# Patient Record
Sex: MALE | Race: WHITE | NOT HISPANIC OR LATINO | Employment: FULL TIME | URBAN - NONMETROPOLITAN AREA
[De-identification: names, ages, dates, MRNs, and addresses within clinical notes are randomized per-mention and may not be internally consistent; named-entity substitution may affect disease eponyms.]

---

## 2017-01-06 ENCOUNTER — TELEPHONE (OUTPATIENT)
Dept: FAMILY MEDICINE | Facility: OTHER | Age: 42
End: 2017-01-06

## 2017-01-06 NOTE — TELEPHONE ENCOUNTER
Reason for call:  RESULTS    1. What is the test that was ordered?   Chest X-ray  2. Who ordered your test?   Employment physical / Job Care  3. When was the test performed?  11/21/2016  Description:   Gerson received a letter from [employer] stating that there are abnormalities in his chest x-ray and to contact his primary provider.  Please call [facility] to advise  Was an appointment offered for this a call?  No  Preferred method for responding to this message:  [phone]  If we cannot reach you directly, may we leave a detailed response at the number you provided?  Yes  Can this message wait until your PCP/Provider returns if not available today? (Yes CAPS/NO SMALL: [phone])

## 2017-02-03 ENCOUNTER — OFFICE VISIT (OUTPATIENT)
Dept: FAMILY MEDICINE | Facility: OTHER | Age: 42
End: 2017-02-03
Attending: FAMILY MEDICINE
Payer: COMMERCIAL

## 2017-02-03 VITALS
DIASTOLIC BLOOD PRESSURE: 72 MMHG | WEIGHT: 195 LBS | SYSTOLIC BLOOD PRESSURE: 121 MMHG | HEART RATE: 76 BPM | BODY MASS INDEX: 26.82 KG/M2 | TEMPERATURE: 96.8 F | RESPIRATION RATE: 17 BRPM

## 2017-02-03 DIAGNOSIS — L03.114 CELLULITIS OF LEFT UPPER EXTREMITY: Primary | ICD-10-CM

## 2017-02-03 DIAGNOSIS — R55 VASOVAGAL SYNCOPE: ICD-10-CM

## 2017-02-03 DIAGNOSIS — M25.522 LEFT ELBOW PAIN: ICD-10-CM

## 2017-02-03 LAB
GRAM STN SPEC: NORMAL
MICRO REPORT STATUS: NORMAL
SPECIMEN SOURCE: NORMAL

## 2017-02-03 PROCEDURE — 10140 I&D HMTMA SEROMA/FLUID COLLJ: CPT | Performed by: FAMILY MEDICINE

## 2017-02-03 PROCEDURE — 99214 OFFICE O/P EST MOD 30 MIN: CPT | Mod: 25 | Performed by: FAMILY MEDICINE

## 2017-02-03 PROCEDURE — 87070 CULTURE OTHR SPECIMN AEROBIC: CPT | Performed by: FAMILY MEDICINE

## 2017-02-03 PROCEDURE — 87205 SMEAR GRAM STAIN: CPT | Performed by: FAMILY MEDICINE

## 2017-02-03 RX ORDER — KETOROLAC TROMETHAMINE 30 MG/ML
30 INJECTION, SOLUTION INTRAMUSCULAR; INTRAVENOUS ONCE
Qty: 1 ML | Refills: 0 | OUTPATIENT
Start: 2017-02-03 | End: 2017-02-03

## 2017-02-03 RX ORDER — CEPHALEXIN 500 MG/1
500 CAPSULE ORAL 3 TIMES DAILY
Qty: 30 CAPSULE | Refills: 0 | Status: SHIPPED | OUTPATIENT
Start: 2017-02-03 | End: 2018-03-15

## 2017-02-03 NOTE — NURSING NOTE
The following medication was given:     MEDICATION: Ketorolac Tromethamine 60MG/2ML (30 mg/mL) (Toradol)  ROUTE: IM  SITE: LUQ - Gluteus  DOSE: 1 ml  LOT #: [id number]  :  [employer]  EXPIRATION DATE: 1/2018  NDC#: 92492-516-78  Pt waits 20 minutes, no reaction, pt ok to leave clinic.  Pt rates pain 8/10 prior to injection and 6/10 15 minutes after injection.    Lucretia Weaver

## 2017-02-03 NOTE — PROGRESS NOTES
SUBJECTIVE:                                                    Gerson Powers is a 41 year old male who presents to clinic today for the following health issues:      Musculoskeletal problem/pain      Duration: this am    Description  Location: left elbow     Intensity:  mild    Accompanying signs and symptoms: swelling, redness    History  Previous similar problem: no   Previous evaluation:  none    Precipitating or alleviating factors:  Trauma or overuse: YES- had pimple on it yesterday, today   Aggravating factors include: none    Therapies tried and outcome: nothing     Denies any direct injury, very painful and radiates down toward fingers    Problem list and histories reviewed & adjusted, as indicated.  Additional history: as documented    Current Outpatient Prescriptions   Medication Sig Dispense Refill     cephALEXin (KEFLEX) 500 MG capsule Take 1 capsule (500 mg) by mouth 3 times daily 30 capsule 0     cyclobenzaprine (FLEXERIL) 10 MG tablet Take 1 tablet (10 mg) by mouth 3 times daily as needed for muscle spasms 30 tablet 3     Problem list, Medication list, Allergies, and Medical/Social/Surgical histories reviewed in EPIC and updated as appropriate.    ROS:  Constitutional, HEENT, cardiovascular, pulmonary, gi and gu systems are negative, except as otherwise noted.    OBJECTIVE:                                                    /72 mmHg  Pulse 76  Temp(Src) 96.8  F (36  C)  Resp 17  Wt 195 lb (88.451 kg)  Body mass index is 26.82 kg/(m^2).  GENERAL APPEARANCE: healthy, alert and no distress initially but after I&D got lightheaded and dizzy, nauseaous, better after ice pack applied, given juice and laid down, occurred again after toradol shot, resolved before discharge  RESP: lungs clear to auscultation - no rales, rhonchi or wheezes  CV: regular rates and rhythm, normal S1 S2, no S3 or S4 and no murmur, click or rub  SKIN: erythema - elbow  PSYCH: mentation appears normal and affect  normal/bright       ASSESSMENT/PLAN:                                                    1. Cellulitis of left upper extremity  No purulence observed, fair amount of bleeding in spite of lidocaine with epi  - Wound Culture Aerobic Bacterial  - Gram stain  - cephALEXin (KEFLEX) 500 MG capsule; Take 1 capsule (500 mg) by mouth 3 times daily  Dispense: 30 capsule; Refill: 0  - Skin Abscess, Simple [31413]  - Hematoma/Fluid Drainage [[id number]]    2. Vasovagal syncope  He was on call overnight and did not sleep, had a little to eat but not much, dsicussed rest, fluids, and eating when he gets home, has mom to drive him home which was recommended.  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 Left elbow pain  toradol given to help with pain control., use ice also  - INJECTION INTRAMUSCULAR OR SUB-Q    Over 40 min spent with this patient, over 50% education and counseling    Patient was agreeable to this plan and had no further questions.  See Patient Instructions    Jesica Clarke MD  [facility]

## 2017-02-03 NOTE — MR AVS SNAPSHOT
After Visit Summary   2/3/2017    Gerson Powers    MRN: [id number]           Patient Information     Date Of Birth          1975        Visit Information        Provider Department      2/3/2017 9:15 AM Jesica Clarke MD [facility]        Today's Diagnoses     Cellulitis of left upper extremity    -  1     Vasovagal syncope         Left elbow pain           Care Instructions    Follow up if not improving        Follow-ups after your visit        Who to contact     If you have questions or need follow up information about today's clinic visit or your schedule please contact [facility] directly at [phone].  Normal or non-critical lab and imaging results will be communicated to you by Exoprisehart, letter or phone within 4 business days after the clinic has received the results. If you do not hear from us within 7 days, please contact the clinic through [employer] or phone. If you have a critical or abnormal lab result, we will notify you by phone as soon as possible.  Submit refill requests through HuoBi or call your pharmacy and they will forward the refill request to us. Please allow 3 business days for your refill to be completed.          Additional Information About Your Visit        MyChart Information     [employer] gives you secure access to your electronic health record. If you see a primary care provider, you can also send messages to your care team and make appointments. If you have questions, please call your primary care clinic.  If you do not have a primary care provider, please call [phone] and they will assist you.        Care EveryWhere ID     This is your Care EveryWhere ID. This could be used by other organizations to access your [facility] medical records  [id number]        Your Vitals Were     Pulse Temperature Respirations             76 96.8  F (36  C) 17          Blood Pressure from Last 3 Encounters:   02/03/17 121/72   11/21/16 118/70    06/25/16 138/78    Weight from Last 3 Encounters:   02/03/17 195 lb (88.451 kg)   11/21/16 195 lb (88.451 kg)   11/19/15 195 lb (88.451 kg)              We Performed the Following     Gram stain     Hematoma/Fluid Drainage [[id number]]     INJECTION INTRAMUSCULAR OR SUB-Q     Skin Abscess, Simple [[id number]]     Wound Culture Aerobic Bacterial          Today's Medication Changes          These changes are accurate as of: 2/3/17 10:15 AM.  If you have any questions, ask your nurse or doctor.               Start taking these medicines.        Dose/Directions    cephALEXin 500 MG capsule   Commonly known as:  KEFLEX   Used for:  Cellulitis of left upper extremity   Started by:  Jesica Clarke MD        Dose:  500 mg   Take 1 capsule (500 mg) by mouth 3 times daily   Quantity:  30 capsule   Refills:  0       ketorolac 30 MG/ML injection   Commonly known as:  TORADOL   Used for:  Cellulitis of left upper extremity, Left elbow pain   Started by:  Jesica Clarke MD        Dose:  30 mg   Inject 1 mL (30 mg) into the muscle once for 1 dose   Quantity:  1 mL   Refills:  0            Where to get your medicines      These medications were sent to [id number] - [place], [place] - [address]   [id number] , [place] [address]     Phone:  [phone]    - cephALEXin 500 MG capsule      Some of these will need a paper prescription and others can be bought over the counter.  Ask your nurse if you have questions.     You don't need a prescription for these medications    - ketorolac 30 MG/ML injection             Primary Care Provider Office Phone # Fax #    Alexis Ann [phone]       [facility] [address] [place] [address]        Thank you!     [facility]. Our goal is always to provide you with excellent care. Hearing back from our patients is one way we can continue to improve our services. Please take a few minutes to  complete the written survey that you may receive in the mail after your visit with us. Thank you!             Your Updated Medication List - Protect others around you: Learn how to safely use, store and throw away your medicines at www.disposemymeds.org.          This list is accurate as of: 2/3/17 10:15 AM.  Always use your most recent med list.                   Brand Name Dispense Instructions for use    cephALEXin 500 MG capsule    KEFLEX    30 capsule    Take 1 capsule (500 mg) by mouth 3 times daily       cyclobenzaprine 10 MG tablet    FLEXERIL    30 tablet    Take 1 tablet (10 mg) by mouth 3 times daily as needed for muscle spasms       ketorolac 30 MG/ML injection    TORADOL    1 mL    Inject 1 mL (30 mg) into the muscle once for 1 dose

## 2017-02-03 NOTE — NURSING NOTE
"Chief Complaint   Patient presents with     Musculoskeletal Problem       Initial /72 mmHg  Pulse 76  Temp(Src) 96.8  F (36  C)  Resp 17  Wt 195 lb (88.451 kg) Estimated body mass index is 26.82 kg/(m^2) as calculated from the following:    Height as of 11/21/16: 5' 11.5\" (1.816 m).    Weight as of this encounter: 195 lb (88.451 kg).  BP completed using cuff size: regular  "

## 2017-02-03 NOTE — PROGRESS NOTES
Subjective: Gerson SOLIZ Powers a 41 year old male who presents today for lesion removal. The lesion(s) is/are located on the elbow, number 1 and measures 7 x 5 cm He The patient reports the lesion is painfull and denies other significant symptoms on ROS. Medications reviewed.    Pause for the cause has been completed prior to the prceedure.   1. Gerson was identified by both name and date of birth   2. The correct site was identified   3. Site was marked by provider    4. Written informed consent correct and signed or verbal authorization  to proceed was obtained   5. Verifed necessary supplies, equipment, and diagnostics are available    6. Time out was performed immediately prior to procedure    Objective: The lesion(s) is/are of the above mentioned size and location and is/are erythematous. The area was prepped and appropriately anesthetized. Using the usual technique, I & D  was performed. An appropriate dressing was applied. The procedure was well tolerated and without complications.     Assessment: cellulits vs hematoma    Plan: Follow up: The patient may return prn.. Wound care instructions provided.  Patient was instructed to be alert for any signs of cutaneous infection.

## 2017-02-06 ENCOUNTER — OFFICE VISIT (OUTPATIENT)
Dept: FAMILY MEDICINE | Facility: OTHER | Age: 42
End: 2017-02-06
Attending: FAMILY MEDICINE
Payer: COMMERCIAL

## 2017-02-06 ENCOUNTER — TELEPHONE (OUTPATIENT)
Dept: FAMILY MEDICINE | Facility: OTHER | Age: 42
End: 2017-02-06

## 2017-02-06 VITALS
HEIGHT: 72 IN | SYSTOLIC BLOOD PRESSURE: 132 MMHG | TEMPERATURE: 98.3 F | DIASTOLIC BLOOD PRESSURE: 78 MMHG | BODY MASS INDEX: 26.41 KG/M2 | HEART RATE: 72 BPM | WEIGHT: 195 LBS

## 2017-02-06 DIAGNOSIS — L03.114 CELLULITIS OF LEFT UPPER EXTREMITY: Primary | ICD-10-CM

## 2017-02-06 PROCEDURE — 99213 OFFICE O/P EST LOW 20 MIN: CPT | Performed by: FAMILY MEDICINE

## 2017-02-06 ASSESSMENT — PAIN SCALES - GENERAL: PAINLEVEL: MILD PAIN (2)

## 2017-02-06 NOTE — NURSING NOTE
"Chief Complaint   Patient presents with     RECHECK       Initial /78 mmHg  Pulse 72  Temp(Src) 98.3  F (36.8  C) (Tympanic)  Ht 5' 11.5\" (1.816 m)  Wt 195 lb (88.451 kg)  BMI 26.82 kg/m2 Estimated body mass index is 26.82 kg/(m^2) as calculated from the following:    Height as of this encounter: 5' 11.5\" (1.816 m).    Weight as of this encounter: 195 lb (88.451 kg).  Medication Reconciliation: complete   Lucretia Weaver    "

## 2017-02-06 NOTE — TELEPHONE ENCOUNTER
Pt states his elbow is still red, swollen and painful.  He states it is a little better today than on Friday.  Pt is taking abx.  He has appt today, do you still want to see him or should he do anything different?

## 2017-02-06 NOTE — TELEPHONE ENCOUNTER
8:10 AM    Reason for Call: Phone Call    Description: Patient was in last Friday and stated that if he was not better within 24-36 hours to come back and see provider. Patient stated he is not better and did schedule today with provider, but would like to speak with nurse to see if he needed to come in or not.    Was an appointment offered for this call? Yes    Preferred method for responding to this message: Telephone Call    If we cannot reach you directly, may we leave a detailed response at the number you provided? Yes    Can this message wait until your PCP/provider returns, if available today? Not applicable,     Kimberly Sullivan

## 2017-02-06 NOTE — PROGRESS NOTES
"  SUBJECTIVE:                                                    Gerson Powers is a 41 year old male who presents to clinic today for the following health issues:      recheck      Duration: 4 days    Description (location/character/radiation): left elbow    Intensity:  moderate    Accompanying signs and symptoms: painful, red, swollen    History (similar episodes/previous evaluation): None    Precipitating or alleviating factors: None    Therapies tried and outcome: I & D, keflex       Problem list and histories reviewed & adjusted, as indicated.  Additional history: as documented    Current Outpatient Prescriptions   Medication Sig Dispense Refill     cephALEXin (KEFLEX) 500 MG capsule Take 1 capsule (500 mg) by mouth 3 times daily 30 capsule 0     cyclobenzaprine (FLEXERIL) 10 MG tablet Take 1 tablet (10 mg) by mouth 3 times daily as needed for muscle spasms 30 tablet 3     Problem list, Medication list, Allergies, and Medical/Social/Surgical histories reviewed in EPIC and updated as appropriate.    ROS:  Constitutional, HEENT, cardiovascular, pulmonary, gi and gu systems are negative, except as otherwise noted.    OBJECTIVE:                                                    /78 mmHg  Pulse 72  Temp(Src) 98.3  F (36.8  C) (Tympanic)  Ht 5' 11.5\" (1.816 m)  Wt 195 lb (88.451 kg)  BMI 26.82 kg/m2  Body mass index is 26.82 kg/(m^2).  GENERAL APPEARANCE: healthy, alert and no distress  SKIN: erythema - right upper extremity/forearm  PSYCH: mentation appears normal and affect normal/bright       ASSESSMENT/PLAN:                                                    1. Cellulitis of left upper extremity  Per patient has significantly improved since Saturday, will have him call thurs/fri if not better and switch to bactrim    Patient was agreeable to this plan and had no further questions.  See Patient Instructions    Jesica Clarke MD  [facility] [place]        "

## 2017-02-10 LAB
BACTERIA SPEC CULT: NORMAL
MICRO REPORT STATUS: NORMAL
SPECIMEN SOURCE: NORMAL

## 2018-03-15 ENCOUNTER — OFFICE VISIT (OUTPATIENT)
Dept: FAMILY MEDICINE | Facility: OTHER | Age: 43
End: 2018-03-15
Attending: FAMILY MEDICINE
Payer: COMMERCIAL

## 2018-03-15 VITALS
TEMPERATURE: 97.3 F | OXYGEN SATURATION: 96 % | DIASTOLIC BLOOD PRESSURE: 72 MMHG | BODY MASS INDEX: 29.13 KG/M2 | WEIGHT: 203.5 LBS | HEIGHT: 70 IN | SYSTOLIC BLOOD PRESSURE: 128 MMHG | HEART RATE: 84 BPM

## 2018-03-15 DIAGNOSIS — J32.9 CHRONIC SINUSITIS, UNSPECIFIED LOCATION: ICD-10-CM

## 2018-03-15 DIAGNOSIS — Z00.00 ROUTINE GENERAL MEDICAL EXAMINATION AT A HEALTH CARE FACILITY: Primary | ICD-10-CM

## 2018-03-15 DIAGNOSIS — G43.009 MIGRAINE WITHOUT AURA AND WITHOUT STATUS MIGRAINOSUS, NOT INTRACTABLE: ICD-10-CM

## 2018-03-15 DIAGNOSIS — M50.30 DDD (DEGENERATIVE DISC DISEASE), CERVICAL: ICD-10-CM

## 2018-03-15 LAB
ALBUMIN SERPL-MCNC: 4 G/DL (ref 3.4–5)
ALP SERPL-CCNC: 95 U/L (ref 40–150)
ALT SERPL W P-5'-P-CCNC: 94 U/L (ref 0–70)
ANION GAP SERPL CALCULATED.3IONS-SCNC: 5 MMOL/L (ref 3–14)
AST SERPL W P-5'-P-CCNC: 40 U/L (ref 0–45)
BILIRUB SERPL-MCNC: 0.3 MG/DL (ref 0.2–1.3)
BUN SERPL-MCNC: 14 MG/DL (ref 7–30)
CALCIUM SERPL-MCNC: 8.6 MG/DL (ref 8.5–10.1)
CHLORIDE SERPL-SCNC: 107 MMOL/L (ref 94–109)
CO2 SERPL-SCNC: 29 MMOL/L (ref 20–32)
CREAT SERPL-MCNC: 0.95 MG/DL (ref 0.66–1.25)
GFR SERPL CREATININE-BSD FRML MDRD: 87 ML/MIN/1.7M2
GLUCOSE SERPL-MCNC: 78 MG/DL (ref 70–99)
POTASSIUM SERPL-SCNC: 3.7 MMOL/L (ref 3.4–5.3)
PROT SERPL-MCNC: 7.5 G/DL (ref 6.8–8.8)
SODIUM SERPL-SCNC: 141 MMOL/L (ref 133–144)

## 2018-03-15 PROCEDURE — 99396 PREV VISIT EST AGE 40-64: CPT | Performed by: FAMILY MEDICINE

## 2018-03-15 PROCEDURE — 80053 COMPREHEN METABOLIC PANEL: CPT | Performed by: FAMILY MEDICINE

## 2018-03-15 PROCEDURE — 36415 COLL VENOUS BLD VENIPUNCTURE: CPT | Performed by: FAMILY MEDICINE

## 2018-03-15 RX ORDER — FLUTICASONE PROPIONATE 50 MCG
1-2 SPRAY, SUSPENSION (ML) NASAL DAILY
Qty: 1 BOTTLE | Refills: 3 | Status: SHIPPED | OUTPATIENT
Start: 2018-03-15 | End: 2019-05-23

## 2018-03-15 RX ORDER — CYCLOBENZAPRINE HCL 5 MG
5 TABLET ORAL 2 TIMES DAILY PRN
Qty: 30 TABLET | Refills: 1 | Status: SHIPPED | OUTPATIENT
Start: 2018-03-15 | End: 2019-05-23

## 2018-03-15 ASSESSMENT — ANXIETY QUESTIONNAIRES
2. NOT BEING ABLE TO STOP OR CONTROL WORRYING: NOT AT ALL
6. BECOMING EASILY ANNOYED OR IRRITABLE: SEVERAL DAYS
4. TROUBLE RELAXING: NOT AT ALL
IF YOU CHECKED OFF ANY PROBLEMS ON THIS QUESTIONNAIRE, HOW DIFFICULT HAVE THESE PROBLEMS MADE IT FOR YOU TO DO YOUR WORK, TAKE CARE OF THINGS AT HOME, OR GET ALONG WITH OTHER PEOPLE: NOT DIFFICULT AT ALL
5. BEING SO RESTLESS THAT IT IS HARD TO SIT STILL: NOT AT ALL
1. FEELING NERVOUS, ANXIOUS, OR ON EDGE: NOT AT ALL
3. WORRYING TOO MUCH ABOUT DIFFERENT THINGS: NOT AT ALL
GAD7 TOTAL SCORE: 1
7. FEELING AFRAID AS IF SOMETHING AWFUL MIGHT HAPPEN: NOT AT ALL

## 2018-03-15 ASSESSMENT — PAIN SCALES - GENERAL: PAINLEVEL: NO PAIN (0)
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
## 2018-03-15 NOTE — MR AVS SNAPSHOT
After Visit Summary   3/15/2018    Gerson Powers    MRN: [id number]           Patient Information     Date Of Birth          1975        Visit Information        Provider Department      3/15/2018 1:30 PM Jesica Clarke MD [facility]        Today's Diagnoses     Routine general medical examination at a health care facility    -  1    DDD (degenerative disc disease), cervical        Migraine without aura and without status migrainosus, not intractable        Chronic sinusitis, unspecified location          Care Instructions      Preventive Health Recommendations  Male Ages 40 to 49    Yearly exam:             See your health care provider every year in order to  o   Review health changes.   o   Discuss preventive care.    o   Review your medicines if your doctor has prescribed any.    You should be tested each year for STDs (sexually transmitted diseases) if you re at risk.     Have a cholesterol test every 5 years.     Have a colonoscopy (test for colon cancer) if someone in your family has had colon cancer or polyps before age 50.     After age 45, have a diabetes test (fasting glucose). If you are at risk for diabetes, you should have this test every 3 years.      Talk with your health care provider about whether or not a prostate cancer screening test (PSA) is right for you.    Shots: Get a flu shot each year. Get a tetanus shot every 10 years.     Nutrition:    Eat at least 5 servings of fruits and vegetables daily.     Eat whole-grain bread, whole-wheat pasta and brown rice instead of white grains and rice.     Talk to your provider about Calcium and Vitamin D.     Lifestyle    Exercise for at least 150 minutes a week (30 minutes a day, 5 days a week). This will help you control your weight and prevent disease.     Limit alcohol to one drink per day.     No smoking.     Wear sunscreen to prevent skin cancer.     See your dentist every six months for an exam and cleaning.               Follow-ups after your visit        Additional Services     OTOLARYNGOLOGY REFERRAL       Your provider has referred you to: [facility] [phone]    Rule out deviated septum, nasal polyps   [email or web address]    Please be aware that coverage of these services is subject to the terms and limitations of your health insurance plan.  Call member services at your health plan with any benefit or coverage questions.      Please bring the following with you to your appointment:    (1) Any X-Rays, CTs or MRIs which have been performed.  Contact the facility where they were done to arrange for  prior to your scheduled appointment.   (2) List of current medications  (3) This referral request   (4) Any documents/labs given to you for this referral                  Future tests that were ordered for you today     Open Future Orders        Priority Expected Expires Ordered    Lipid Profile (Chol, Trig, HDL, LDL calc) Routine 3/14/2018 3/1/2019 3/13/2018            Who to contact     If you have questions or need follow up information about today's clinic visit or your schedule please contact [facility] at [phone].  Normal or non-critical lab and imaging results will be communicated to you by MyChart, letter or phone within 4 business days after the clinic has received the results. If you do not hear from us within 7 days, please contact the clinic through MyChart or phone. If you have a critical or abnormal lab result, we will notify you by phone as soon as possible.  Submit refill requests through [employer] or call your pharmacy and they will forward the refill request to us. Please allow 3 business days for your refill to be completed.          Additional Information About Your Visit        [employer] Information     [employer] gives you secure access to your electronic health record. If you see a primary care  "provider, you can also send messages to your care team and make appointments. If you have questions, please call your primary care clinic.  If you do not have a primary care provider, please call [phone] and they will assist you.        Care EveryWhere ID     This is your Care EveryWhere ID. This could be used by other organizations to access your [facility] medical records  [id number]        Your Vitals Were     Pulse Temperature Height Pulse Oximetry BMI (Body Mass Index)       84 97.3  F (36.3  C) (Tympanic) 5' 10.47\" (1.79 m) 96% 28.81 kg/m2        Blood Pressure from Last 3 Encounters:   03/15/18 128/72   02/06/17 132/78   02/03/17 121/72    Weight from Last 3 Encounters:   03/15/18 203 lb 8 oz (92.3 kg)   02/06/17 195 lb (88.5 kg)   02/03/17 195 lb (88.5 kg)              We Performed the Following     Comprehensive metabolic panel     OTOLARYNGOLOGY REFERRAL          Today's Medication Changes          These changes are accurate as of 3/15/18  2:06 PM.  If you have any questions, ask your nurse or doctor.               Start taking these medicines.        Dose/Directions    cyclobenzaprine 5 MG tablet   Commonly known as:  FLEXERIL   Used for:  DDD (degenerative disc disease), cervical, Migraine without aura and without status migrainosus, not intractable   Started by:  Jesica Clarke MD        Dose:  5 mg   Take 1 tablet (5 mg) by mouth 2 times daily as needed for muscle spasms   Quantity:  30 tablet   Refills:  1       fluticasone 50 MCG/ACT spray   Commonly known as:  FLONASE   Used for:  Chronic sinusitis, unspecified location   Started by:  Jesica Clarke MD        Dose:  1-2 spray   Spray 1-2 sprays into both nostrils daily   Quantity:  1 Bottle   Refills:  3            Where to get your medicines      These medications were sent to [facility] - [address] [address] - [address] [address]     Phone:  [phone]     cyclobenzaprine 5 MG tablet    fluticasone " 50 MCG/ACT spray                Primary Care Provider Office Phone # Fax #    Jesica Clarke [phone]       [facility] [place] [address]  [facility] [address]        Equal Access to Services     JOSELUIS BUSCH : Hadii aad ku hadlennyo Soomaali, waaxda luqadaha, qaybta kaalmada adeegyada, waxcecil mortonin shannonn adekeely humphries iris molina. So [facility] [phone].    ATENCIÓN: Si habla español, tiene a escobedo disposición servicios gratuitos de asistencia lingüística. Llame al [phone].    We comply with applicable federal civil rights laws and [place] laws. We do not discriminate on the basis of race, color, national origin, age, disability, sex, sexual orientation, or gender identity.            Thank you!     Thank you for choosing [facility] ([facility]  for your care. Our goal is always to provide you with excellent care. Hearing back from our patients is one way we can continue to improve our services. Please take a few minutes to complete the written survey that you may receive in the mail after your visit with us. Thank you!             Your Updated Medication List - Protect others around you: Learn how to safely use, store and throw away your medicines at www.disposemymeds.org.          This list is accurate as of 3/15/18  2:06 PM.  Always use your most recent med list.                   Brand Name Dispense Instructions for use Diagnosis    cyclobenzaprine 5 MG tablet    FLEXERIL    30 tablet    Take 1 tablet (5 mg) by mouth 2 times daily as needed for muscle spasms    DDD (degenerative disc disease), cervical, Migraine without aura and without status migrainosus, not intractable       fluticasone 50 MCG/ACT spray    FLONASE    1 Bottle    Spray 1-2 sprays into both nostrils daily    Chronic sinusitis, unspecified location

## 2018-03-15 NOTE — LETTER
March 15, 2018      Gerson Powers  [address]   [place] [address]        To Whom It May Concern:    Gerson Powers was seen in our clinic for a complete physical. He may return to work 3/17/2018 with no restrictions.      Sincerely,        Jesica Clarke MD

## 2018-03-15 NOTE — NURSING NOTE
"Chief Complaint   Patient presents with     Physical       Initial /72  Pulse 84  Temp 97.3  F (36.3  C) (Tympanic)  Ht 5' 10.47\" (1.79 m)  Wt 203 lb 8 oz (92.3 kg)  SpO2 96%  BMI 28.81 kg/m2 Estimated body mass index is 28.81 kg/(m^2) as calculated from the following:    Height as of this encounter: 5' 10.47\" (1.79 m).    Weight as of this encounter: 203 lb 8 oz (92.3 kg).  Medication Reconciliation: complete     Lidya Riggs    "

## 2018-03-16 ASSESSMENT — ANXIETY QUESTIONNAIRES: GAD7 TOTAL SCORE: 1

## 2018-03-16 ASSESSMENT — PATIENT HEALTH QUESTIONNAIRE - PHQ9: SUM OF ALL RESPONSES TO PHQ QUESTIONS 1-9: 1

## 2018-03-20 ENCOUNTER — TELEPHONE (OUTPATIENT)
Dept: FAMILY MEDICINE | Facility: OTHER | Age: 43
End: 2018-03-20

## 2018-03-20 DIAGNOSIS — Z00.00 ROUTINE GENERAL MEDICAL EXAMINATION AT A HEALTH CARE FACILITY: ICD-10-CM

## 2018-03-20 DIAGNOSIS — E78.1 HYPERTRIGLYCERIDEMIA: Primary | ICD-10-CM

## 2018-03-20 LAB
CHOLEST SERPL-MCNC: 210 MG/DL
HDLC SERPL-MCNC: 35 MG/DL
LDLC SERPL CALC-MCNC: 122 MG/DL
NONHDLC SERPL-MCNC: 175 MG/DL
TRIGL SERPL-MCNC: 266 MG/DL

## 2018-03-20 PROCEDURE — 36415 COLL VENOUS BLD VENIPUNCTURE: CPT | Performed by: FAMILY MEDICINE

## 2018-03-20 PROCEDURE — 80061 LIPID PANEL: CPT | Performed by: FAMILY MEDICINE

## 2018-03-20 RX ORDER — GEMFIBROZIL 600 MG/1
600 TABLET, FILM COATED ORAL 2 TIMES DAILY
Qty: 60 TABLET | Refills: 1 | Status: SHIPPED | OUTPATIENT
Start: 2018-03-20 | End: 2018-07-20

## 2018-03-20 NOTE — TELEPHONE ENCOUNTER
Notified patient of lab results, he is willing to take something for his cholesterol and triglycerides. He is wondering what you will prescribe him and any major side effects? Let me know and I'll hudson it up for you and get back to him. Thanks!  Cristal Levy

## 2018-03-21 ENCOUNTER — OFFICE VISIT (OUTPATIENT)
Dept: OTOLARYNGOLOGY | Facility: OTHER | Age: 43
End: 2018-03-21
Attending: FAMILY MEDICINE
Payer: COMMERCIAL

## 2018-03-21 VITALS
BODY MASS INDEX: 28.42 KG/M2 | DIASTOLIC BLOOD PRESSURE: 76 MMHG | WEIGHT: 203 LBS | OXYGEN SATURATION: 98 % | HEIGHT: 71 IN | SYSTOLIC BLOOD PRESSURE: 120 MMHG | HEART RATE: 73 BPM | TEMPERATURE: 96.5 F

## 2018-03-21 DIAGNOSIS — R09.81 NASAL CONGESTION: Primary | ICD-10-CM

## 2018-03-21 DIAGNOSIS — R09.82 PND (POST-NASAL DRIP): ICD-10-CM

## 2018-03-21 DIAGNOSIS — R06.83 SNORING: ICD-10-CM

## 2018-03-21 DIAGNOSIS — J34.3 NASAL TURBINATE HYPERTROPHY: ICD-10-CM

## 2018-03-21 PROCEDURE — 99213 OFFICE O/P EST LOW 20 MIN: CPT | Mod: 25 | Performed by: NURSE PRACTITIONER

## 2018-03-21 PROCEDURE — 31231 NASAL ENDOSCOPY DX: CPT | Performed by: NURSE PRACTITIONER

## 2018-03-21 RX ORDER — CETIRIZINE HYDROCHLORIDE 10 MG/1
10 TABLET ORAL EVERY EVENING
Qty: 30 TABLET | Refills: 11 | Status: SHIPPED | OUTPATIENT
Start: 2018-03-21 | End: 2018-06-22 | Stop reason: ALTCHOICE

## 2018-03-21 ASSESSMENT — PAIN SCALES - GENERAL: PAINLEVEL: NO PAIN (0)

## 2018-03-21 NOTE — PATIENT INSTRUCTIONS
Zyrtec 10 mg daily.  Flonase 2 sprays to each nostril once daily.    Use nasal saline as discussed today. Over the counter Cecil med saline irrigation is recommended.   Use warm distilled water and 1-2 packets of the salt solution that comes with the bottle (2 packets if you were instructed to use hypertonic saline), dissolve in bottle up to 240 ml dorothy.  Irrigate each side of your nose leaning over the sink, using 1/3 to 1/2 the volume of the bottle in each nostril every irrigation.  Irrigate 2-3 times daily and as needed. After irrigation, blow nose gently, then apply any other nasal medication that you may have been prescribed today (nasal steroids or nasal antihistamines)     Follow up in 6 weeks, sooner as needed.      Thank you for allowing Maryjane Iniguez CNP and our ENT team to participate in your care.  If your medications are too expensive, please give the nurse a call.  We can possibly change this medication.  If you have a scheduling or an appointment question please contact [facility] Coordinator at their direct line [phone].   ALL nursing questions or concerns can be directed to your ENT nurse at: [phone]- [place]
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
## 2018-03-21 NOTE — NURSING NOTE
"Chief Complaint   Patient presents with     Consult     Chronic sinusitis-fernando        Initial /76 (BP Location: Right arm, Patient Position: Chair, Cuff Size: Adult Large)  Pulse 73  Temp 96.5  F (35.8  C) (Tympanic)  Ht 5' 11\" (1.803 m)  Wt 203 lb (92.1 kg)  SpO2 98%  BMI 28.31 kg/m2 Estimated body mass index is 28.31 kg/(m^2) as calculated from the following:    Height as of this encounter: 5' 11\" (1.803 m).    Weight as of this encounter: 203 lb (92.1 kg).  Medication Reconciliation: complete     Haydee Cherry LPN    Scope: [id number]  "
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
## 2018-03-21 NOTE — MR AVS SNAPSHOT
After Visit Summary   3/21/2018    Gerson Powers    MRN: [id number]           Patient Information     Date Of Birth          1975        Visit Information        Provider Department      3/21/2018 9:45 AM Maryjane Iniguez APRN CNP [facility] [place]        Today's Diagnoses     Nasal congestion    -  1    PND (post-nasal drip)          Care Instructions    Zyrtec 10 mg daily.  Flonase 2 sprays to each nostril once daily.    Use nasal saline as discussed today. Over the counter Cecil med saline irrigation is recommended.   Use warm distilled water and 1-2 packets of the salt solution that comes with the bottle (2 packets if you were instructed to use hypertonic saline), dissolve in bottle up to 240 ml dorothy.  Irrigate each side of your nose leaning over the sink, using 1/3 to 1/2 the volume of the bottle in each nostril every irrigation.  Irrigate 2-3 times daily and as needed. After irrigation, blow nose gently, then apply any other nasal medication that you may have been prescribed today (nasal steroids or nasal antihistamines)     Follow up in 6 weeks, sooner as needed.      Thank you for allowing Maryjane Iniguez CNP and our ENT team to participate in your care.  If your medications are too expensive, please give the nurse a call.  We can possibly change this medication.  If you have a scheduling or an appointment question please contact [facility] our Health Unit Coordinator at their direct line [phone].   ALL nursing questions or concerns can be directed to your ENT nurse at: [phone]- Faraz            Follow-ups after your visit        Follow-up notes from your care team     Return in about 6 weeks (around 5/2/2018) for f/u nasal congestion.      Future tests that were ordered for you today     Open Future Orders        Priority Expected Expires Ordered    Comprehensive metabolic panel Routine 5/3/2018 3/1/2019 3/20/2018    Lipid Profile (Chol, Trig, HDL, LDL calc) Routine 5/3/2018  "3/1/2019 3/20/2018            Who to contact     If you have questions or need follow up information about today's clinic visit or your schedule please contact [facility] [place] directly at [phone].  Normal or non-critical lab and imaging results will be communicated to you by MyChart, letter or phone within 4 business days after the clinic has received the results. If you do not hear from us within 7 days, please contact the clinic through MyChart or phone. If you have a critical or abnormal lab result, we will notify you by phone as soon as possible.  Submit refill requests through goBramble or call your pharmacy and they will forward the refill request to us. Please allow 3 business days for your refill to be completed.          Additional Information About Your Visit        Looking for [employer] Information     go[employer] gives you secure access to your electronic health record. If you see a primary care provider, you can also send messages to your care team and make appointments. If you have questions, please call your primary care clinic.  If you do not have a primary care provider, please call [phone] and they will assist you.        Care EveryWhere ID     This is your Care EveryWhere ID. This could be used by other organizations to access your [place] medical records  [id number]        Your Vitals Were     Pulse Temperature Height Pulse Oximetry BMI (Body Mass Index)       73 96.5  F (35.8  C) (Tympanic) 5' 11\" (1.803 m) 98% 28.31 kg/m2        Blood Pressure from Last 3 Encounters:   03/21/18 120/76   03/15/18 128/72   02/06/17 132/78    Weight from Last 3 Encounters:   03/21/18 203 lb (92.1 kg)   03/15/18 203 lb 8 oz (92.3 kg)   02/06/17 195 lb (88.5 kg)              Today, you had the following     No orders found for display         Today's Medication Changes          These changes are accurate as of 3/21/18 10:11 AM.  If you have any questions, ask your nurse or doctor.               Start taking these " medicines.        Dose/Directions    cetirizine 10 MG tablet   Commonly known as:  zyrTEC   Used for:  Nasal congestion, PND (post-nasal drip)   Started by:  Maryjane Iniguez APRN CNP        Dose:  10 mg   Take 1 tablet (10 mg) by mouth every evening   Quantity:  30 tablet   Refills:  11            Where to get your medicines      These medications were sent to [facility] - [place], [place] - [address]   [address] , [place] [address]     Phone:  [phone]     cetirizine 10 MG tablet                Primary Care Provider Office Phone # Fax #    Jesicakacy Clarke [phone]       [facility] [place] [address]  [place] [address]        Equal Access to Services     [facility] : Hadii ryley peterson hadasho Soomaali, waaxda luqadaha, qaybta kaalmada adeegyada, magan simmons . So [facility] [phone].    ATENCIÓN: Si habla español, tiene a escobedo disposición servicios gratuitos de asistencia lingüística. [facility] [phone].    We comply with applicable federal civil rights laws and [place] laws. We do not discriminate on the basis of race, color, national origin, age, disability, sex, sexual orientation, or gender identity.            Thank you!     Thank you for choosing [facility]  for your care. Our goal is always to provide you with excellent care. Hearing back from our patients is one way we can continue to improve our services. Please take a few minutes to complete the written survey that you may receive in the mail after your visit with us. Thank you!             Your Updated Medication List - Protect others around you: Learn how to safely use, store and throw away your medicines at www.disposemymeds.org.          This list is accurate as of 3/21/18 10:11 AM.  Always use your most recent med list.                   Brand Name Dispense Instructions for use Diagnosis    cetirizine 10 MG tablet    zyrTEC    30 tablet    Take 1 tablet (10 mg)  by mouth every evening    Nasal congestion, PND (post-nasal drip)       cyclobenzaprine 5 MG tablet    FLEXERIL    30 tablet    Take 1 tablet (5 mg) by mouth 2 times daily as needed for muscle spasms    DDD (degenerative disc disease), cervical, Migraine without aura and without status migrainosus, not intractable       fluticasone 50 MCG/ACT spray    FLONASE    1 Bottle    Spray 1-2 sprays into both nostrils daily    Chronic sinusitis, unspecified location       gemfibrozil 600 MG tablet    LOPID    60 tablet    Take 1 tablet (600 mg) by mouth 2 times daily    Hypertriglyceridemia

## 2018-03-27 ENCOUNTER — HOSPITAL ENCOUNTER (EMERGENCY)
Facility: HOSPITAL | Age: 43
Discharge: HOME | End: 2018-03-27
Attending: NURSE PRACTITIONER | Admitting: NURSE PRACTITIONER
Payer: COMMERCIAL

## 2018-03-27 VITALS
OXYGEN SATURATION: 98 % | DIASTOLIC BLOOD PRESSURE: 89 MMHG | SYSTOLIC BLOOD PRESSURE: 139 MMHG | TEMPERATURE: 97.2 F | RESPIRATION RATE: 16 BRPM

## 2018-03-27 DIAGNOSIS — J01.01 ACUTE RECURRENT MAXILLARY SINUSITIS: ICD-10-CM

## 2018-03-27 DIAGNOSIS — H10.32 ACUTE BACTERIAL CONJUNCTIVITIS OF LEFT EYE: ICD-10-CM

## 2018-03-27 PROCEDURE — 99213 OFFICE O/P EST LOW 20 MIN: CPT | Performed by: NURSE PRACTITIONER

## 2018-03-27 PROCEDURE — G0463 HOSPITAL OUTPT CLINIC VISIT: HCPCS

## 2018-03-27 RX ORDER — CIPROFLOXACIN HYDROCHLORIDE 3.5 MG/ML
1 SOLUTION/ DROPS TOPICAL
Qty: 1 BOTTLE | Refills: 0 | Status: SHIPPED | OUTPATIENT
Start: 2018-03-27 | End: 2018-04-03

## 2018-03-27 ASSESSMENT — ENCOUNTER SYMPTOMS
APPETITE CHANGE: 0
NAUSEA: 0
DIARRHEA: 0
MYALGIAS: 1
FEVER: 1
VOMITING: 0
COUGH: 0
ARTHRALGIAS: 1
CHILLS: 0
SORE THROAT: 1

## 2018-03-27 NOTE — ED AVS SNAPSHOT
[facility]    [address] [address]    Phone:  [phone]                                       Gerson Powers   MRN: [id number]    Department:  [facility]   Date of Visit:  3/27/2018           Patient Information     Date Of Birth          1975        Your diagnoses for this visit were:     Acute recurrent maxillary sinusitis     Acute bacterial conjunctivitis of left eye        You were seen by Traci Guan NP.      Follow-up Information     Follow up with [facility].    Specialty:  EMERGENCY MEDICINE    Why:  As needed, If symptoms worsen, or concerns develop    Contact information:    [address]  [place] [id number] [phone]    Additional information:    From [facility]: Take [address]. Turn left at [address]. Turn left at the first stoplight on [address] [place] [address]. At the first stop sign, take a right onto [address]. Take a left into the parking lot and continue through until you reach the North enterance of the building.       From [place]: Take [address]. Take the [address] ramp towards [place]. Turn left onto [address]. Take a slight right onto [address]. Turn left at the first stoplight on [address]. At the first stop sign, take a right onto [address]. Take a left into the parking lot and continue through until you reach the North enterance of the building.       From [place]: Take [address]. Take a right at [address]. At the first stop sign, take a right onto [address]. Take a left into the parking lot and continue through until you reach the North enterance of the building.         Follow up with Jesica Clarke MD.    Specialty:  Family Practice    Why:  As needed, if symptoms do not improve    Contact information:    [facility] [place]  [address]  [place] [address]  [phone]        Discharge  References/Attachments     SINUSITIS (ANTIBIOTIC TREATMENT) (ENGLISH)    CONJUNCTIVITIS, NONSPECIFIC (ENGLISH)      Your next 10 appointments already scheduled     May 07, 2018  9:15 AM CDT   (Arrive by 9:00 AM)   Return Visit with [facility] ([facility] )    [address]  [place] [address]   [phone]                 Review of your medicines      START taking        Dose / Directions Last dose taken    amoxicillin-clavulanate 875-125 MG per tablet   Commonly known as:  AUGMENTIN   Dose:  1 tablet   Quantity:  14 tablet        Take 1 tablet by mouth 2 times daily for 7 days   Refills:  0        ciprofloxacin 0.3 % ophthalmic solution   Commonly known as:  CILOXAN   Dose:  1 drop   Quantity:  1 Bottle        Apply 1 drop to eye every 4 hours (while awake) for 7 days   Refills:  0          Our records show that you are taking the medicines listed below. If these are incorrect, please call your family doctor or clinic.        Dose / Directions Last dose taken    cetirizine 10 MG tablet   Commonly known as:  zyrTEC   Dose:  10 mg   Quantity:  30 tablet        Take 1 tablet (10 mg) by mouth every evening   Refills:  11        cyclobenzaprine 5 MG tablet   Commonly known as:  FLEXERIL   Dose:  5 mg   Quantity:  30 tablet        Take 1 tablet (5 mg) by mouth 2 times daily as needed for muscle spasms   Refills:  1        fluticasone 50 MCG/ACT spray   Commonly known as:  FLONASE   Dose:  1-2 spray   Quantity:  1 Bottle        Spray 1-2 sprays into both nostrils daily   Refills:  3        gemfibrozil 600 MG tablet   Commonly known as:  LOPID   Dose:  600 mg   Quantity:  60 tablet        Take 1 tablet (600 mg) by mouth 2 times daily   Refills:  1                Prescriptions were sent or printed at these locations (2 Prescriptions)                   [facility] - [place], [place] - [address]    [address], [place] [address]    Telephone:   [phone]   Fax:  [phone]   Hours:                  E-Prescribed (2 of 2)         ciprofloxacin (CILOXAN) 0.3 % ophthalmic solution               amoxicillin-clavulanate (AUGMENTIN) 875-125 MG per tablet                Orders Needing Specimen Collection     None      Pending Results     No orders found from 3/25/2018 to 3/28/2018.            Pending Culture Results     No orders found from 3/25/2018 to 3/28/2018.            Thank you for choosing [facility]       Thank you for choosing [facility] for your care. Our goal is always to provide you with excellent care. Hearing back from our patients is one way we can continue to improve our services. Please take a few minutes to complete the written survey that you may receive in the mail after you visit with us. Thank you!        [employer] Information     [employer] gives you secure access to your electronic health record. If you see a primary care provider, you can also send messages to your care team and make appointments. If you have questions, please call your primary care clinic.  If you do not have a primary care provider, please call [phone] and they will assist you.        Care EveryWhere ID     This is your Care EveryWhere ID. This could be used by other organizations to access your [facility] medical records  [id number]        Equal Access to Services     JOSELUIS BUSCH AH: Yamile Mei, vinayak faith, jose salinas, magan molina. So [facility] [phone].    ATENCIÓN: Si habla español, tiene a escobedo disposición servicios gratuitos de asistencia lingüística. Llame al [phone].    We comply with applicable federal civil rights laws and [place] laws. We do not discriminate on the basis of race, color, national origin, age, disability, sex, sexual orientation, or gender identity.            After Visit Summary       This is your record. Keep this with you and show to your community pharmacist(s) and  doctor(s) at your next visit.

## 2018-03-27 NOTE — ED NOTES
Pt has a mattery left eye,unable to see out of that eye due to the mattering, sore throat, post nasal drip since Thursday night.

## 2018-03-27 NOTE — ED AVS SNAPSHOT
[facility]    [address] [place] [address]    Phone:  [phone]                                       Gerson Powers   MRN: [id number]    Department:  [facility]   Date of Visit:  3/27/2018           After Visit Summary Signature Page     I have received my discharge instructions, and my questions have been answered. I have discussed any challenges I see with this plan with the nurse or doctor.    ..........................................................................................................................................  Patient/Patient Representative Signature      ..........................................................................................................................................  Patient Representative Print Name and Relationship to Patient    ..................................................               ................................................  Date                                            Time    ..........................................................................................................................................  Reviewed by Signature/Title    ...................................................              ..............................................  Date                                                            Time

## 2018-03-28 ASSESSMENT — ENCOUNTER SYMPTOMS
EYE REDNESS: 1
EYE DISCHARGE: 1
PHOTOPHOBIA: 0
EYE ITCHING: 0
EYE PAIN: 1
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
## 2018-05-07 ENCOUNTER — OFFICE VISIT (OUTPATIENT)
Dept: OTOLARYNGOLOGY | Facility: OTHER | Age: 43
End: 2018-05-07
Attending: NURSE PRACTITIONER
Payer: COMMERCIAL

## 2018-05-07 VITALS
DIASTOLIC BLOOD PRESSURE: 76 MMHG | WEIGHT: 195 LBS | HEIGHT: 71 IN | TEMPERATURE: 97.6 F | SYSTOLIC BLOOD PRESSURE: 130 MMHG | BODY MASS INDEX: 27.3 KG/M2 | HEART RATE: 71 BPM

## 2018-05-07 DIAGNOSIS — R06.83 SNORING: ICD-10-CM

## 2018-05-07 DIAGNOSIS — J34.3 NASAL TURBINATE HYPERTROPHY: ICD-10-CM

## 2018-05-07 DIAGNOSIS — R09.81 NASAL CONGESTION: Primary | ICD-10-CM

## 2018-05-07 DIAGNOSIS — R09.82 PND (POST-NASAL DRIP): ICD-10-CM

## 2018-05-07 DIAGNOSIS — E78.1 HYPERTRIGLYCERIDEMIA: ICD-10-CM

## 2018-05-07 DIAGNOSIS — J34.89 NASAL MUCOSA DRY: ICD-10-CM

## 2018-05-07 LAB
ALBUMIN SERPL-MCNC: 3.9 G/DL (ref 3.4–5)
ALP SERPL-CCNC: 88 U/L (ref 40–150)
ALT SERPL W P-5'-P-CCNC: 39 U/L (ref 0–70)
ANION GAP SERPL CALCULATED.3IONS-SCNC: 4 MMOL/L (ref 3–14)
AST SERPL W P-5'-P-CCNC: 17 U/L (ref 0–45)
BILIRUB SERPL-MCNC: 0.3 MG/DL (ref 0.2–1.3)
BUN SERPL-MCNC: 15 MG/DL (ref 7–30)
CALCIUM SERPL-MCNC: 8.7 MG/DL (ref 8.5–10.1)
CHLORIDE SERPL-SCNC: 110 MMOL/L (ref 94–109)
CHOLEST SERPL-MCNC: 180 MG/DL
CO2 SERPL-SCNC: 27 MMOL/L (ref 20–32)
CREAT SERPL-MCNC: 0.76 MG/DL (ref 0.66–1.25)
GFR SERPL CREATININE-BSD FRML MDRD: >90 ML/MIN/1.7M2
GLUCOSE SERPL-MCNC: 105 MG/DL (ref 70–99)
HDLC SERPL-MCNC: 36 MG/DL
LDLC SERPL CALC-MCNC: 101 MG/DL
NONHDLC SERPL-MCNC: 144 MG/DL
POTASSIUM SERPL-SCNC: 4.5 MMOL/L (ref 3.4–5.3)
PROT SERPL-MCNC: 7.6 G/DL (ref 6.8–8.8)
SODIUM SERPL-SCNC: 141 MMOL/L (ref 133–144)
TRIGL SERPL-MCNC: 217 MG/DL

## 2018-05-07 PROCEDURE — 80053 COMPREHEN METABOLIC PANEL: CPT | Performed by: FAMILY MEDICINE

## 2018-05-07 PROCEDURE — 36415 COLL VENOUS BLD VENIPUNCTURE: CPT | Performed by: FAMILY MEDICINE

## 2018-05-07 PROCEDURE — 80061 LIPID PANEL: CPT | Performed by: FAMILY MEDICINE

## 2018-05-07 PROCEDURE — 99213 OFFICE O/P EST LOW 20 MIN: CPT | Performed by: NURSE PRACTITIONER

## 2018-05-07 ASSESSMENT — PAIN SCALES - GENERAL: PAINLEVEL: NO PAIN (0)

## 2018-05-07 NOTE — NURSING NOTE
"Chief Complaint   Patient presents with     Sinus Problem     Pt is here for a f/u nasal congestion, PND, TH, and snoring.  Pt was placed on Zyrtec.       Initial /76 (Patient Position: Right side, Cuff Size: Adult Regular)  Pulse 71  Temp 97.6  F (36.4  C) (Tympanic)  Ht 5' 11\" (1.803 m)  Wt 195 lb (88.5 kg)  BMI 27.2 kg/m2 Estimated body mass index is 27.2 kg/(m^2) as calculated from the following:    Height as of this encounter: 5' 11\" (1.803 m).    Weight as of this encounter: 195 lb (88.5 kg).  Medication Reconciliation: complete    Olga Gresham LPN    "

## 2018-05-07 NOTE — PATIENT INSTRUCTIONS
Follow through with allergy testing.    Use nasal saline as needed.  Apply medical grade Vaseline to inside both nostrils before bed.    Continue Flonase.    I will see you after allergy testing for further discussion.      Thank you for allowing Maryjane Iniguez CNP and our ENT team to participate in your care.  If your medications are too expensive, please give the nurse a call.  We can possibly change this medication.  If you have a scheduling or an appointment question please contact [facility] our Health Unit Coordinator at their direct line [phone].   ALL nursing questions or concerns can be directed to your ENT nurse at: [phone]- Faraz

## 2018-05-07 NOTE — PROGRESS NOTES
"Otolaryngology Progress Note           Chief Complaint:     Patient presents with:  Sinus Problem: Pt is here for a f/u nasal congestion, PND, TH, and snoring.  Pt was placed on Zyrtec.         History of Present Illness:     Gerson Powers is a 42 year old male here to follow up. I initially saw him 3/21/18 with c/o 1 year history of chronic nasal congestion and mild PND and louder snoring. Flonase had been effective. No history of chronic sinusitis and no concerns regarding seasonal allergies. 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 Had him continue on Flonase and start Zyrtec 10 mg daily along with BID/PRN Cecil Med sinus irrigation.     Today Gerson tells me he was treated for a sinus infection 6 days after seeing me. States he had started the Cecil Med sinus irrigation 2 days prior to the sinus infection, and now is not interested in using it anymore. 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 He is compliant with the daily Flonase, feels like this helps with nasal congestion prior to bed time. Occasional blood in tissue from left naris when he blows his nose, denies dripping. Currently denies sinus pain/pressure/purulent drainage. No change in snoring. States his kids do not notice him stopping breathing when he is sleeping. He continues to have the nasal congestion/PND as he did prior to seeing me.     Patient reports history of drug use 10 years ago, wondering if this did something to his sinuses. Denies current use.          Review of Systems:     See HPI         Physical Exam:     /76 (Patient Position: Right side, Cuff Size: Adult Regular)  Pulse 71  Temp 97.6  F (36.4  C) (Tympanic)  Ht 5' 11\" (1.803 m)  Wt 195 lb (88.5 kg)  BMI 27.2 kg/m2  General - The patient is well nourished and well developed, and appears to have good nutritional status.  Alert and " oriented to person and place, interactive.  Head and Face - Normocephalic and atraumatic, with no gross asymmetry noted of the contour of the facial features.  The facial nerve is intact, with strong symmetric movements.  Neck-no palpable lymphadenopathy or thyroid mass.  Trachea is midline.  Eyes - Extraocular movements intact.   Ears- External ears normal. Canals clear. Right tympanic membrane intact without effusion, worrisome retraction or mass. Left tympanic membrane intact without effusion, worrisome retraction or mass.    Nose - Nasal mucosa is pink and moist with no abnormal mucus.  The septum was grossly midline and non-obstructive, bilateral IT hypertrophy. Left septum dry with dried blood, no active oozing.  No polyps, masses, or purulence noted on examination.  Mouth - Examination of the oral cavity shows pink, healthy, moist mucosa. Dentition in good condition.  No lesions or ulceration noted. The tongue is mobile and midline.    Throat - The walls of the oropharynx were smooth, pink, moist, symmetric, and had no lesions or ulcerations.  The tonsillar pillars and soft palate were symmetric.  The uvula was midline on elevation.           Assessment and Plan:       ICD-10-CM    1. Nasal congestion R09.81    2. PND (post-nasal drip) R09.82    3. Nasal turbinate hypertrophy J34.3    4. Snoring R06.83    5. Nasal mucosa dry J34.89      Patient noncompliance with nasal saline and antihistamine, which I explained, would be beneficial if he was compliant with it.  Continue with Flonase 2 sprays to each nostril once daily.     Allergen avoidance measures were discussed and are important in preventing symptoms from occurring or worsening.  Follow up for allergy testing as recommended.  This may be a blood test (mRAST) or skin testing ( modified quantitative testing).  Indications for allergy testing include:   1) Confirm suspicion of allergic rhinitis due to inhalant allergies  2) Identify the offending allergen  to determine specific mode of treatment  3) In the case of chronic rhinosinusitis: when symptoms are not controlled by avoidance and pharmacotherapy  4) In the Asthma patient when exacerbations may be due to perennial allergen exposure  5) Suspect food allergy  6) Otitis Media, chronic rhinitis, atopic dermatitis, Meniere disease, headache, pharyngitis or eye symptoms  Stop your antihistamine 5 days before allergy testing  If immunotherapy (IT) is recommended, there is continued risk of anaphylaxis. Anaphylaxis can cause death. The patient will need to be monitored for 30 minutes post injection. They must present their epinephrine pen prior to injection.  Subcutaneous as well as sublingual immunotherapy (SLIT) were discussed as potential treatment options. The patient was told SLIT is not approved by the FDA and is cash pay. The general time frame of immunotherapy was discussed (generally 3-5 years, sometimes longer), and the basic immunology behind IT was discussed.  Signed consent was obtained, and the risks of immunotherapy were discussed, including the potential for anaphylaxis    If no significant findings, consider sinus CT.   I will see patient after MQT, encouraged him to return sooner as needed.    Snoring with no apnea, consider sleep study if symptoms worsen, he defers right now.     Maryjane Iniguez NP  ENT  [facility], [place]  [phone]

## 2018-05-07 NOTE — LETTER
5/7/2018         RE: Gerson Powers  [address] [place] [address]        Dear Colleague,    Thank you for referring your patient, Gerson Powers, to the [facility]. Please see a copy of my visit note below.    Otolaryngology Progress Note           Chief Complaint:     Patient presents with:  Sinus Problem: Pt is here for a f/u nasal congestion, PND, TH, and snoring.  Pt was placed on Zyrtec.         History of Present Illness:     Gerson Powers is a 42 year old male here to follow up. I initially saw him 3/21/18 with c/o 1 year history of chronic nasal congestion and mild PND and louder snoring. Flonase had been effective. No history of chronic sinusitis and no concerns regarding seasonal allergies. 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 Had him continue on Flonase and start Zyrtec 10 mg daily along with BID/PRN Cecil Med sinus irrigation.     Today Gerson tells me he was treated for a sinus infection 6 days after seeing me. States he had started the Cecil Med sinus irrigation 2 days prior to the sinus infection, and now is not interested in using it anymore. 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 He is compliant with the daily Flonase, feels like this helps with nasal congestion prior to bed time. Occasional blood in tissue from left naris when he blows his nose, denies dripping. Currently denies sinus pain/pressure/purulent drainage. No change in snoring. States his kids do not notice him stopping breathing when he is sleeping. He continues to have the nasal congestion/PND as he did prior to seeing me.     Patient reports history of drug use 10 years ago, wondering if this did something to his sinuses. Denies current use.          Review of Systems:     See HPI         Physical Exam:     /76 (Patient Position: Right side, Cuff Size:  "Adult Regular)  Pulse 71  Temp 97.6  F (36.4  C) (Tympanic)  Ht 5' 11\" (1.803 m)  Wt 195 lb (88.5 kg)  BMI 27.2 kg/m2  General - The patient is well nourished and well developed, and appears to have good nutritional status.  Alert and oriented to person and place, interactive.  Head and Face - Normocephalic and atraumatic, with no gross asymmetry noted of the contour of the facial features.  The facial nerve is intact, with strong symmetric movements.  Neck-no palpable lymphadenopathy or thyroid mass.  Trachea is midline.  Eyes - Extraocular movements intact.   Ears- External ears normal. Canals clear. Right tympanic membrane intact without effusion, worrisome retraction or mass. Left tympanic membrane intact without effusion, worrisome retraction or mass.    Nose - Nasal mucosa is pink and moist with no abnormal mucus.  The septum was grossly midline and non-obstructive, bilateral IT hypertrophy. Left septum dry with dried blood, no active oozing.  No polyps, masses, or purulence noted on examination.  Mouth - Examination of the oral cavity shows pink, healthy, moist mucosa. Dentition in good condition.  No lesions or ulceration noted. The tongue is mobile and midline.    Throat - The walls of the oropharynx were smooth, pink, moist, symmetric, and had no lesions or ulcerations.  The tonsillar pillars and soft palate were symmetric.  The uvula was midline on elevation.           Assessment and Plan:       ICD-10-CM    1. Nasal congestion R09.81    2. PND (post-nasal drip) R09.82    3. Nasal turbinate hypertrophy J34.3    4. Snoring R06.83    5. Nasal mucosa dry J34.89      Patient noncompliance with nasal saline and antihistamine, which I explained, would be beneficial if he was compliant with it.  Continue with Flonase 2 sprays to each nostril once daily.     Allergen avoidance measures were discussed and are important in preventing symptoms from occurring or worsening.  Follow up for allergy testing as " recommended.  This may be a blood test (mRAST) or skin testing ( modified quantitative testing).  Indications for allergy testing include:   1) Confirm suspicion of allergic rhinitis due to inhalant allergies  2) Identify the offending allergen to determine specific mode of treatment  3) In the case of chronic rhinosinusitis: when symptoms are not controlled by avoidance and pharmacotherapy  4) In the Asthma patient when exacerbations may be due to perennial allergen exposure  5) Suspect food allergy  6) Otitis Media, chronic rhinitis, atopic dermatitis, Meniere disease, headache, pharyngitis or eye symptoms  Stop your antihistamine 5 days before allergy testing  If immunotherapy (IT) is recommended, there is continued risk of anaphylaxis. Anaphylaxis can cause death. The patient will need to be monitored for 30 minutes post injection. They must present their epinephrine pen prior to injection.  Subcutaneous as well as sublingual immunotherapy (SLIT) were discussed as potential treatment options. The patient was told SLIT is not approved by the FDA and is cash pay. The general time frame of immunotherapy was discussed (generally 3-5 years, sometimes longer), and the basic immunology behind IT was discussed.  Signed consent was obtained, and the risks of immunotherapy were discussed, including the potential for anaphylaxis    If no significant findings, consider sinus CT.   I will see patient after MQT, encouraged him to return sooner as needed.    Snoring with no apnea, consider sleep study if symptoms worsen, he defers right now.     Maryjane Iniguez NP  ENT  [facility], [place]  [phone]      Again, thank you for allowing me to participate in the care of your patient.        Sincerely,        Maryjane Iniguez, DAYA CNP

## 2018-05-07 NOTE — MR AVS SNAPSHOT
After Visit Summary   5/7/2018    Gerson Powers    MRN: [id number]           Patient Information     Date Of Birth          1975        Visit Information        Provider Department      5/7/2018 9:15 AM Maryjane Iniguez APRN CNP [facility]        Care Instructions    Follow through with allergy testing.    Use nasal saline as needed.  Apply medical grade Vaseline to inside both nostrils before bed.    Continue Flonase.    I will see you after allergy testing for further discussion.      Thank you for allowing Maryjane Iniguez CNP and our ENT team to participate in your care.  If your medications are too expensive, please give the nurse a call.  We can possibly change this medication.  If you have a scheduling or an appointment question please contact [facility] Coordinator at their direct line [phone].   ALL nursing questions or concerns can be directed to your ENT nurse at: [phone]- alex             Follow-ups after your visit        Follow-up notes from your care team     Return for MQT.      Who to contact     If you have questions or need follow up information about today's clinic visit or your schedule please contact [facility] directly at [phone].  Normal or non-critical lab and imaging results will be communicated to you by MyChart, letter or phone within 4 business days after the clinic has received the results. If you do not hear from us within 7 days, please contact the clinic through [employer] or phone. If you have a critical or abnormal lab result, we will notify you by phone as soon as possible.  Submit refill requests through [employer] or call your pharmacy and they will forward the refill request to us. Please allow 3 business days for your refill to be completed.          Additional Information About Your Visit        [employer] Information     [employer] gives you secure access to your electronic health record. If you see a primary  "care provider, you can also send messages to your care team and make appointments. If you have questions, please call your primary care clinic.  If you do not have a primary care provider, please call [phone] and they will assist you.        Care EveryWhere ID     This is your Care EveryWhere ID. This could be used by other organizations to access your [facility] medical records  [id number]        Your Vitals Were     Pulse Temperature Height BMI (Body Mass Index)          71 97.6  F (36.4  C) (Tympanic) 5' 11\" (1.803 m) 27.2 kg/m2         Blood Pressure from Last 3 Encounters:   05/07/18 130/76   03/27/18 139/89   03/21/18 120/76    Weight from Last 3 Encounters:   05/07/18 195 lb (88.5 kg)   03/21/18 203 lb (92.1 kg)   03/15/18 203 lb 8 oz (92.3 kg)              Today, you had the following     No orders found for display         Today's Medication Changes          These changes are accurate as of 5/7/18  9:30 AM.  If you have any questions, ask your nurse or doctor.               These medicines have changed or have updated prescriptions.        Dose/Directions    cetirizine 10 MG tablet   Commonly known as:  zyrTEC   This may have changed:    - when to take this  - reasons to take this   Used for:  Nasal congestion, PND (post-nasal drip)        Dose:  10 mg   Take 1 tablet (10 mg) by mouth every evening   Quantity:  30 tablet   Refills:  11                Primary Care Provider Office Phone # Fax #    Jesica Clarke [phone]       [facility] [place] [address]  [place] [address]        Equal Access to Services     [facility]: Haddaniela Mei, vinayak faith, magan darling. So [facility] [phone].    ATENCIÓN: Si habla español, tiene a escobedo disposición servicios gratuitos de asistencia lingüística. Tutu al [phone].    We comply with applicable federal civil rights laws and [place] laws. We do not " discriminate on the basis of race, color, national origin, age, disability, sex, sexual orientation, or gender identity.            Thank you!     Thank you for choosing [facility]  [facility] your [facility]. Our goal is always to provide you with excellent care. Hearing back from our patients is one way we can continue to improve our services. Please take a few minutes to complete the written survey that you may receive in the mail after your visit with us. Thank you!             Your Updated Medication List - Protect others around you: Learn how to safely use, store and throw away your medicines at www.disposemymeds.org.          This list is accurate as of 5/7/18  9:30 AM.  Always use your most recent med list.                   Brand Name Dispense Instructions for use Diagnosis    cetirizine 10 MG tablet    zyrTEC    30 tablet    Take 1 tablet (10 mg) by mouth every evening    Nasal congestion, PND (post-nasal drip)       cyclobenzaprine 5 MG tablet    FLEXERIL    30 tablet    Take 1 tablet (5 mg) by mouth 2 times daily as needed for muscle spasms    DDD (degenerative disc disease), cervical, Migraine without aura and without status migrainosus, not intractable       fluticasone 50 MCG/ACT spray    FLONASE    1 Bottle    Spray 1-2 sprays into both nostrils daily    Chronic sinusitis, unspecified location       gemfibrozil 600 MG tablet    LOPID    60 tablet    Take 1 tablet (600 mg) by mouth 2 times daily    Hypertriglyceridemia

## 2018-06-06 ENCOUNTER — OFFICE VISIT (OUTPATIENT)
Dept: OTOLARYNGOLOGY | Facility: OTHER | Age: 43
End: 2018-06-06
Attending: NURSE PRACTITIONER
Payer: COMMERCIAL

## 2018-06-06 ENCOUNTER — OFFICE VISIT (OUTPATIENT)
Dept: ALLERGY | Facility: OTHER | Age: 43
End: 2018-06-06
Attending: NURSE PRACTITIONER
Payer: COMMERCIAL

## 2018-06-06 VITALS
DIASTOLIC BLOOD PRESSURE: 94 MMHG | TEMPERATURE: 98.3 F | HEIGHT: 71 IN | BODY MASS INDEX: 27.3 KG/M2 | SYSTOLIC BLOOD PRESSURE: 136 MMHG | WEIGHT: 195 LBS | HEART RATE: 79 BPM

## 2018-06-06 DIAGNOSIS — J30.89 PERENNIAL ALLERGIC RHINITIS WITH SEASONAL VARIATION: ICD-10-CM

## 2018-06-06 DIAGNOSIS — J30.2 PERENNIAL ALLERGIC RHINITIS WITH SEASONAL VARIATION: ICD-10-CM

## 2018-06-06 DIAGNOSIS — R09.81 NASAL CONGESTION: ICD-10-CM

## 2018-06-06 DIAGNOSIS — J34.3 NASAL TURBINATE HYPERTROPHY: ICD-10-CM

## 2018-06-06 DIAGNOSIS — R09.82 PND (POST-NASAL DRIP): ICD-10-CM

## 2018-06-06 DIAGNOSIS — T78.40XA ALLERGIC REACTION, INITIAL ENCOUNTER: Primary | ICD-10-CM

## 2018-06-06 DIAGNOSIS — R09.82 POST-NASAL DRIP: Primary | ICD-10-CM

## 2018-06-06 PROCEDURE — 95004 PERQ TESTS W/ALRGNC XTRCS: CPT

## 2018-06-06 PROCEDURE — 99213 OFFICE O/P EST LOW 20 MIN: CPT | Mod: 25 | Performed by: NURSE PRACTITIONER

## 2018-06-06 PROCEDURE — 95024 IQ TESTS W/ALLERGENIC XTRCS: CPT

## 2018-06-06 RX ORDER — EPINEPHRINE 0.3 MG/.3ML
0.3 INJECTION SUBCUTANEOUS PRN
Qty: 0.6 ML | Refills: 1 | Status: SHIPPED | OUTPATIENT
Start: 2018-06-06 | End: 2021-11-01

## 2018-06-06 ASSESSMENT — PAIN SCALES - GENERAL: PAINLEVEL: NO PAIN (0)

## 2018-06-06 NOTE — LETTER
"    6/6/2018         RE: Gerson Powers  [address] [place] [address]        Dear Colleague,    Thank you for referring your patient, Gerson Powers, to the [facility]. Please see a copy of my visit note below.    Otolaryngology Progress Note           Chief Complaint:     Patient presents with:  other: MQT allergy testing and follow-up         History of Present Illness:     Gerson Powers is a 42 year old male here to follow up on MQT that was performed today. He tolerated testing without difficulty.  To note, he has a chronic history of seasonal allergies (perennial with seasonal component), with this past year being the most difficult. No history of chronic sinusitis. Typical symptoms include nasal congestion and PND.    He reports that Flonase has been very helpful. Noticed how beneficial it was when he stopped it for 2 days. Daily Zyrtec and PRN nasal saline irrigation have also been helpful.      MQT Results 6/6/18  Dilution #6: ragweed, jamie grass, birch, maple, oak, lenny, eastern cottonwood, black walnut, alternaria  Dilution #5: pigweed, russian thistle, elm, hormodendrum, epicoccum, fusarium, helminthosporium, cat, dog, dust  Dilution #2: pine, aspergillus         Review of Systems:     See HPI         Physical Exam:     BP (!) 136/94 (BP Location: Right arm, Patient Position: Chair, Cuff Size: Adult Regular)  Pulse 79  Temp 98.3  F (36.8  C) (Oral)  Ht 5' 11\" (1.803 m)  Wt 195 lb (88.5 kg)  BMI 27.2 kg/m2  General - The patient is well nourished and well developed, and appears to have good nutritional status.  Alert and oriented to person and place, interactive.  Head and Face - Normocephalic and atraumatic, with no gross asymmetry noted of the contour of the facial features.  The facial nerve is intact, with strong symmetric movements.  Neck-no palpable lymphadenopathy or thyroid mass.  Trachea is midline.  Eyes - Extraocular movements intact.   Ears- External ears normal. " Canals clear. Right tympanic membrane intact without effusion, worrisome retraction or mass. Left tympanic membrane intact without effusion, worrisome retraction or mass.    Nose - Nasal mucosa is pink and moist with no abnormal mucus.  The septum was grossly midline and non-obstructive, bilateral turbinate hypertrophy.  No polyps, masses, or purulence noted on examination.  Mouth - Examination of the oral cavity shows pink, healthy, moist mucosa. Dentition in good condition.  No lesions or ulceration noted. The tongue is mobile and midline.    Throat - The walls of the oropharynx were smooth, pink, moist, symmetric, and had no lesions or ulcerations.  The tonsillar pillars and soft palate were symmetric.  The uvula was midline on elevation.    Skin - Post intradermal testing appearance.         Assessment and Plan:       ICD-10-CM    1. Allergic reaction, initial encounter T78.40XA EPINEPHrine (EPIPEN/ADRENACLICK/OR ANY BX GENERIC EQUIV) 0.3 MG/0.3ML injection 2-pack   2. Perennial allergic rhinitis with seasonal variation J30.89     J30.2    3. Nasal congestion R09.81    4. PND (post-nasal drip) R09.82    5. Nasal turbinate hypertrophy J34.3      Reviewed MQT results with patient. Discussed ongoing medical management vs immunotherapy.    He wants to start immunotherapy using SLIT.    Follow up in 6 months with Gwendolyn Chand ENT PA , sooner if directed   Continue saline (hypertonic) and nasal steroid  Allergen avoidance and antihistamine use discussed.    Subcutaneous as well as sublingual immunotherapy (SLIT) were discussed as potential treatment options.  The patient was told SLIT is not approved by the FDA and is cash pay.  The general time frame of immunotherapy was discussed (generally 3-5 years, sometimes longer), and the basic immunology behind IT was discussed.  Risks of immunotherapy, including anaphylaxis with airway loss, were again discussed.  Treatment with epinephrine pen injection was discussed and  education provided.  The patient will begin immunotherapy (SLIT).  Subcutaneous epinepherine prescribed.  6 month follow up recommended.  The patient was told to contact my office if anything should arise of concern prior to that time and I or Gwendolyn would see them sooner.    Continue Flonase 2 sprays to each nostril once daily, Zyrtec 10 mg daily, Cecil Med sinus irrigation BID/PRN.  Allergen avoidance recommended.    Follow up in 6 months for routine immunotherapy follow up, sooner as needed.       Maryjane Iniguez NP  ENT  [facility], [place]  [phone]      Again, thank you for allowing me to participate in the care of your patient.        Sincerely,        DAYA Duran CNP

## 2018-06-06 NOTE — PATIENT INSTRUCTIONS
Will start sublingual immunotherapy    When you get first vial, make appointment to take first dose here, bring epi-pen.    Zyrtec 10 mg daily  Flonase 2 sprays to each nostril once daily  Cecil med sinus rinse twice daily and as needed  Allergen avoidance    Follow up in 6 months, sooner as needed.      Thank you for allowing Maryjane Iniguez CNP and our ENT team to participate in your care.  If your medications are too expensive, please give the nurse a call.  We can possibly change this medication.  If you have a scheduling or an appointment question please contact [facility] Coordinator at their direct line [phone].   ALL nursing questions or concerns can be directed to your ENT nurse at: [phone]- Qbqe

## 2018-06-06 NOTE — MR AVS SNAPSHOT
After Visit Summary   6/6/2018    Gerson Powers    MRN: [id number]           Patient Information     Date Of Birth          1975        Visit Information        Provider Department      6/6/2018 3:30 PM Maryjane Iniguez APRN CNP [facility]        Today's Diagnoses     Allergic reaction, initial encounter    -  1      Care Instructions    Will start sublingual immunotherapy    When you get first vial, make appointment to take first dose here, bring epi-pen.    Zyrtec 10 mg daily  Flonase 2 sprays to each nostril once daily  Cecil med sinus rinse twice daily and as needed  Allergen avoidance    Follow up in 6 months, sooner as needed.      Thank you for allowing Maryjane Iniguez CNP and our ENT team to participate in your care.  If your medications are too expensive, please give the nurse a call.  We can possibly change this medication.  If you have a scheduling or an appointment question please contact [facility] Unit Coordinator at their direct line [phone].   ALL nursing questions or concerns can be directed to your ENT nurse at: [phone]- alex            Follow-ups after your visit        Follow-up notes from your care team     Return in about 6 months (around 12/6/2018) for 6 month immunotherapy f/u.      Who to contact     If you have questions or need follow up information about today's clinic visit or your schedule please contact [facility] directly at [phone].  Normal or non-critical lab and imaging results will be communicated to you by MyChart, letter or phone within 4 business days after the clinic has received the results. If you do not hear from us within 7 days, please contact the clinic through MyChart or phone. If you have a critical or abnormal lab result, we will notify you by phone as soon as possible.  Submit refill requests through [employer] or call your pharmacy and they will forward the refill request to us. Please allow 3  "business days for your refill to be completed.          Additional Information About Your Visit        [employer] Information     [employer] gives you secure access to your electronic health record. If you see a primary care provider, you can also send messages to your care team and make appointments. If you have questions, please call your primary care clinic.  If you do not have a primary care provider, please call [phone] and they will assist you.        Care EveryWhere ID     This is your Care EveryWhere ID. This could be used by other organizations to access your [facility] medical records  [id number]        Your Vitals Were     Pulse Temperature Height BMI (Body Mass Index)          79 98.3  F (36.8  C) (Oral) 5' 11\" (1.803 m) 27.2 kg/m2         Blood Pressure from Last 3 Encounters:   06/06/18 (!) 136/94   05/07/18 130/76   03/27/18 139/89    Weight from Last 3 Encounters:   06/06/18 195 lb (88.5 kg)   05/07/18 195 lb (88.5 kg)   03/21/18 203 lb (92.1 kg)              Today, you had the following     No orders found for display         Today's Medication Changes          These changes are accurate as of 6/6/18  3:51 PM.  If you have any questions, ask your nurse or doctor.               Start taking these medicines.        Dose/Directions    EPINEPHrine 0.3 MG/0.3ML injection 2-pack   Commonly known as:  EPIPEN/ADRENACLICK/or ANY BX GENERIC EQUIV   Used for:  Allergic reaction, initial encounter   Started by:  Maryjane Iniguez APRN CNP        Dose:  0.3 mg   Inject 0.3 mLs (0.3 mg) into the muscle as needed for anaphylaxis   Quantity:  0.6 mL   Refills:  1         These medicines have changed or have updated prescriptions.        Dose/Directions    cetirizine 10 MG tablet   Commonly known as:  zyrTEC   This may have changed:    - when to take this  - reasons to take this   Used for:  Nasal congestion, PND (post-nasal drip)        Dose:  10 mg   Take 1 tablet (10 mg) by mouth every evening   Quantity:  30 " tablet   Refills:  11            Where to get your medicines      These medications were sent to [id number] - [place], [place] - [address]   [id number] , [place] [address]     Phone:  [phone]     EPINEPHrine 0.3 MG/0.3ML injection 2-pack                Primary Care Provider Office Phone # Fax #    Jesica CHANTALE Clarke [phone]       [facility] [place] [address]  [place] [address]        Equal Access to Services     LIU BUSCH : Hadii aad ku hadasho Soomaali, waaxda luqadaha, qaybta kaalmada adeegyada, waxay idiin hayaan adeeg kharash lafifi . So [facility] [phone].    ATENCIÓN: Si habla español, tiene a escobedo disposición servicios gratuitos de asistencia lingüística. [facility] [phone].    We comply with applicable federal civil rights laws and [place] laws. We do not discriminate on the basis of race, color, national origin, age, disability, sex, sexual orientation, or gender identity.            Thank you!     Thank you for choosing [facility]  for your care. Our goal is always to provide you with excellent care. Hearing back from our patients is one way we can continue to improve our services. Please take a few minutes to complete the written survey that you may receive in the mail after your visit with us. Thank you!             Your Updated Medication List - Protect others around you: Learn how to safely use, store and throw away your medicines at www.disposemymeds.org.          This list is accurate as of 6/6/18  3:51 PM.  Always use your most recent med list.                   Brand Name Dispense Instructions for use Diagnosis    cetirizine 10 MG tablet    zyrTEC    30 tablet    Take 1 tablet (10 mg) by mouth every evening    Nasal congestion, PND (post-nasal drip)       cyclobenzaprine 5 MG tablet    FLEXERIL    30 tablet    Take 1 tablet (5 mg) by mouth 2 times daily as needed for muscle spasms    DDD (degenerative disc disease), cervical,  Migraine without aura and without status migrainosus, not intractable       EPINEPHrine 0.3 MG/0.3ML injection 2-pack    EPIPEN/ADRENACLICK/or ANY BX GENERIC EQUIV    0.6 mL    Inject 0.3 mLs (0.3 mg) into the muscle as needed for anaphylaxis    Allergic reaction, initial encounter       fluticasone 50 MCG/ACT spray    FLONASE    1 Bottle    Spray 1-2 sprays into both nostrils daily    Chronic sinusitis, unspecified location       gemfibrozil 600 MG tablet    LOPID    60 tablet    Take 1 tablet (600 mg) by mouth 2 times daily    Hypertriglyceridemia

## 2018-06-06 NOTE — PROGRESS NOTES
"Otolaryngology Progress Note           Chief Complaint:     Patient presents with:  other: MQT allergy testing and follow-up         History of Present Illness:     Gerson Powers is a 42 year old male here to follow up on MQT that was performed today. He tolerated testing without difficulty.  To note, he has a chronic history of seasonal allergies (perennial with seasonal component), with this past year being the most difficult. No history of chronic sinusitis. Typical symptoms include nasal congestion and PND.    He reports that Flonase has been very helpful. Noticed how beneficial it was when he stopped it for 2 days. Daily Zyrtec and PRN nasal saline irrigation have also been helpful.      MQT Results 6/6/18  Dilution #6: ragweed, jamie grass, birch, maple, oak, lenny, eastern cottonwood, black walnut, alternaria  Dilution #5: pigweed, russian thistle, elm, hormodendrum, epicoccum, fusarium, helminthosporium, cat, dog, dust  Dilution #2: pine, aspergillus         Review of Systems:     See HPI         Physical Exam:     BP (!) 136/94 (BP Location: Right arm, Patient Position: Chair, Cuff Size: Adult Regular)  Pulse 79  Temp 98.3  F (36.8  C) (Oral)  Ht 5' 11\" (1.803 m)  Wt 195 lb (88.5 kg)  BMI 27.2 kg/m2  General - The patient is well nourished and well developed, and appears to have good nutritional status.  Alert and oriented to person and place, interactive.  Head and Face - Normocephalic and atraumatic, with no gross asymmetry noted of the contour of the facial features.  The facial nerve is intact, with strong symmetric movements.  Neck-no palpable lymphadenopathy or thyroid mass.  Trachea is midline.  Eyes - Extraocular movements intact.   Ears- External ears normal. Canals clear. Right tympanic membrane intact without effusion, worrisome retraction or mass. Left tympanic membrane intact without effusion, worrisome retraction or mass.    Nose - Nasal mucosa is pink and moist with no abnormal mucus. "  The septum was grossly midline and non-obstructive, bilateral turbinate hypertrophy.  No polyps, masses, or purulence noted on examination.  Mouth - Examination of the oral cavity shows pink, healthy, moist mucosa. Dentition in good condition.  No lesions or ulceration noted. The tongue is mobile and midline.    Throat - The walls of the oropharynx were smooth, pink, moist, symmetric, and had no lesions or ulcerations.  The tonsillar pillars and soft palate were symmetric.  The uvula was midline on elevation.    Skin - Post intradermal testing appearance.         Assessment and Plan:       ICD-10-CM    1. Allergic reaction, initial encounter T78.40XA EPINEPHrine (EPIPEN/ADRENACLICK/OR ANY BX GENERIC EQUIV) 0.3 MG/0.3ML injection 2-pack   2. Perennial allergic rhinitis with seasonal variation J30.89     J30.2    3. Nasal congestion R09.81    4. PND (post-nasal drip) R09.82    5. Nasal turbinate hypertrophy J34.3      Reviewed MQT results with patient. Discussed ongoing medical management vs immunotherapy.    He wants to start immunotherapy using SLIT.    Follow up in 6 months with Gwendolyn Chand ENT PA , sooner if directed   Continue saline (hypertonic) and nasal steroid  Allergen avoidance and antihistamine use discussed.    Subcutaneous as well as sublingual immunotherapy (SLIT) were discussed as potential treatment options.  The patient was told SLIT is not approved by the FDA and is cash pay.  The general time frame of immunotherapy was discussed (generally 3-5 years, sometimes longer), and the basic immunology behind IT was discussed.  Risks of immunotherapy, including anaphylaxis with airway loss, were again discussed.  Treatment with epinephrine pen injection was discussed and education provided.  The patient will begin immunotherapy (SLIT).  Subcutaneous epinepherine prescribed.  6 month follow up recommended.  The patient was told to contact my office if anything should arise of concern prior to that time and I  or Gwendolyn would see them sooner.    Continue Flonase 2 sprays to each nostril once daily, Zyrtec 10 mg daily, Cecil Med sinus irrigation BID/PRN.  Allergen avoidance recommended.    Follow up in 6 months for routine immunotherapy follow up, sooner as needed.       Maryjane Iniguze NP  ENT  [facility] [place]  [phone]

## 2018-06-06 NOTE — PROGRESS NOTES
Prior to testing, verified patient identity using patient's name and date of birth.    Gerson was seen 06/06/18 for allergy skin testing. Patient was seen by this nurse in conjunction with ENT provider. All encounter details are documented in ENT Provider's appointment from this same date. Please see referenced encounter for this visits documentation.     Sudha Sagastume RN

## 2018-06-06 NOTE — NURSING NOTE
"Chief Complaint   Patient presents with     other     MQT allergy testing and follow-up       Initial BP (!) 136/94 (BP Location: Right arm, Patient Position: Chair, Cuff Size: Adult Regular)  Pulse 79  Temp 98.3  F (36.8  C) (Oral)  Ht 5' 11\" (1.803 m)  Wt 195 lb (88.5 kg)  BMI 27.2 kg/m2 Estimated body mass index is 27.2 kg/(m^2) as calculated from the following:    Height as of this encounter: 5' 11\" (1.803 m).    Weight as of this encounter: 195 lb (88.5 kg).  Medication Reconciliation: complete    Sudha Sagastume RN     Patient's diastolic blood pressure is slightly elevated.  Provider notified.  Ok to proceed with testing.    This patient presents today for allergy skin testing.      Symptoms have included increased snoring, congestion, itchy, watery eyes, cough, left nare bleeding and mucous, and one recent sinus infection.  Symptoms are present year round.     This patient lives in a single family home, with a basement.  This patient does not suspect mold, water or moisture issues in the home.  He does report occasional mold in the window ton in the basement that he removes.  There is carpet in the home, and in the bedroom.  Home has forced air, natural gas heat and does have window air conditioning units.        This patient has 2 dogs and 2 cats for pets.  They are all inside.  They do not sleep in bed with the patient.    This patient has had allergy testing in the past.  He thinks it was 30 years ago.  He does not recall specifics, but thinks he was positive to grass and pine trees.  He did not do treatment.    This patient's medications have been reviewed prior to testing and all appropriate medications have been stopped.    Consent is signed by patient and signature is verified.     MQT/ID test is performed per protocol.  The patient tolerated testing well.  All findings are recorded on the paper flow sheet. Results are reviewed with this patient.  They are given written information " regarding allergy.       The patient will follow-up with Maryjane Iniguez CNP, for treatment plan.      Sudha Sagastume RN

## 2018-06-07 ENCOUNTER — TELEPHONE (OUTPATIENT)
Dept: ALLERGY | Facility: OTHER | Age: 43
End: 2018-06-07

## 2018-06-07 ENCOUNTER — TRANSFERRED RECORDS (OUTPATIENT)
Dept: HEALTH INFORMATION MANAGEMENT | Facility: HOSPITAL | Age: 43
End: 2018-06-07

## 2018-06-07 DIAGNOSIS — J30.2 PERENNIAL ALLERGIC RHINITIS WITH SEASONAL VARIATION: Primary | ICD-10-CM

## 2018-06-07 DIAGNOSIS — J30.89 PERENNIAL ALLERGIC RHINITIS WITH SEASONAL VARIATION: Primary | ICD-10-CM

## 2018-06-12 ENCOUNTER — TRANSFERRED RECORDS (OUTPATIENT)
Dept: HEALTH INFORMATION MANAGEMENT | Facility: HOSPITAL | Age: 43
End: 2018-06-12

## 2018-06-22 ENCOUNTER — TELEPHONE (OUTPATIENT)
Dept: ALLERGY | Facility: OTHER | Age: 43
End: 2018-06-22

## 2018-06-22 ENCOUNTER — ALLIED HEALTH/NURSE VISIT (OUTPATIENT)
Dept: ALLERGY | Facility: OTHER | Age: 43
End: 2018-06-22
Attending: FAMILY MEDICINE
Payer: COMMERCIAL

## 2018-06-22 DIAGNOSIS — J30.2 PERENNIAL ALLERGIC RHINITIS WITH SEASONAL VARIATION: Primary | ICD-10-CM

## 2018-06-22 DIAGNOSIS — J30.89 PERENNIAL ALLERGIC RHINITIS WITH SEASONAL VARIATION: Primary | ICD-10-CM

## 2018-06-22 RX ORDER — FEXOFENADINE HCL 180 MG/1
180 TABLET ORAL DAILY
Qty: 30 TABLET | Refills: 11 | Status: SHIPPED | OUTPATIENT
Start: 2018-06-22 | End: 2021-11-01

## 2018-06-22 NOTE — PROGRESS NOTES
Prior to education, verified patient identity using patient's name and date of birth.    Patient is here for education and  SLIT drops and first administration.  All instructions for SLIT use are reviewed with the patient.  Signs and symptoms of anaphylaxis both local and systemic are discussed.  Patient verbalized understanding.  Patient is taught how to use EPI pen and a return demonstration is given.  First administration of SLIT is done by patient without incident.  Patient has EPI pen with today.  Patient is aware that a follow up is needed in 6 months and may call us for refills of SLIT when it starts to run low.      Sudha Sagastume RN

## 2018-06-22 NOTE — MR AVS SNAPSHOT
After Visit Summary   6/22/2018    Gerson Powers    MRN: [id number]           Patient Information     Date Of Birth          1975        Visit Information        Provider Department      6/22/2018 11:15 AM HC SHOT ROOM [facility]        Today's Diagnoses     Perennial allergic rhinitis with seasonal variation    -  1       Follow-ups after your visit        Your next 10 appointments already scheduled     Dec 03, 2018  9:45 AM CST   (Arrive by 9:30 AM)   Return Visit with DAYA Peña CNP   [facility] [place] ([facility] [place] )    [address]  [place] [address]   [phone]              Who to contact     If you have questions or need follow up information about today's clinic visit or your schedule please contact [facility] directly at [phone].  Normal or non-critical lab and imaging results will be communicated to you by MyChart, letter or phone within 4 business days after the clinic has received the results. If you do not hear from us within 7 days, please contact the clinic through MyChart or phone. If you have a critical or abnormal lab result, we will notify you by phone as soon as possible.  Submit refill requests through [employer] or call your pharmacy and they will forward the refill request to us. Please allow 3 business days for your refill to be completed.          Additional Information About Your Visit        MyChart Information     [employer] gives you secure access to your electronic health record. If you see a primary care provider, you can also send messages to your care team and make appointments. If you have questions, please call your primary care clinic.  If you do not have a primary care provider, please call [phone] and they will assist you.        Care EveryWhere ID     This is your Care EveryWhere ID. This could be used by other organizations to access your [facility] medical records  [id number]          Blood Pressure from Last 3 Encounters:   06/06/18 (!) 136/94   05/07/18 130/76   03/27/18 139/89    Weight from Last 3 Encounters:   06/06/18 195 lb (88.5 kg)   05/07/18 195 lb (88.5 kg)   03/21/18 203 lb (92.1 kg)              Today, you had the following     No orders found for display         Today's Medication Changes          These changes are accurate as of 6/22/18 11:39 AM.  If you have any questions, ask your nurse or doctor.               These medicines have changed or have updated prescriptions.        Dose/Directions    cetirizine 10 MG tablet   Commonly known as:  zyrTEC   This may have changed:    - when to take this  - reasons to take this   Used for:  Nasal congestion, PND (post-nasal drip)        Dose:  10 mg   Take 1 tablet (10 mg) by mouth every evening   Quantity:  30 tablet   Refills:  11                Primary Care Provider Office Phone # Fax #    Jesica Clarke [phone]       [facility] [place] [facility] [address]        Equal Access to Services     [facility] AH: Hadii ryley peterson hadasho Sobeckie, waaxda luqadaha, qaybta kaalmada rita, magan simmons . So [facility] [phone].    ATENCIÓN: Si habla español, tiene a escobedo disposición servicios gratuitos de asistencia lingüística. [facility] al [phone].    We comply with applicable federal civil rights laws and [place] laws. We do not discriminate on the basis of race, color, national origin, age, disability, sex, sexual orientation, or gender identity.            Thank you!     Thank you for choosing [facility]  for your care. Our goal is always to provide you with excellent care. Hearing back from our patients is one way we can continue to improve our services. Please take a few minutes to complete the written survey that you may receive in the mail after your visit with us. Thank you!             Your Updated Medication List - Protect others around you:  Learn how to safely use, store and throw away your medicines at www.disposemymeds.org.          This list is accurate as of 6/22/18 11:39 AM.  Always use your most recent med list.                   Brand Name Dispense Instructions for use Diagnosis    cetirizine 10 MG tablet    zyrTEC    30 tablet    Take 1 tablet (10 mg) by mouth every evening    Nasal congestion, PND (post-nasal drip)       cyclobenzaprine 5 MG tablet    FLEXERIL    30 tablet    Take 1 tablet (5 mg) by mouth 2 times daily as needed for muscle spasms    DDD (degenerative disc disease), cervical, Migraine without aura and without status migrainosus, not intractable       EPINEPHrine 0.3 MG/0.3ML injection 2-pack    EPIPEN/ADRENACLICK/or ANY BX GENERIC EQUIV    0.6 mL    Inject 0.3 mLs (0.3 mg) into the muscle as needed for anaphylaxis    Allergic reaction, initial encounter       fluticasone 50 MCG/ACT spray    FLONASE    1 Bottle    Spray 1-2 sprays into both nostrils daily    Chronic sinusitis, unspecified location       gemfibrozil 600 MG tablet    LOPID    60 tablet    Take 1 tablet (600 mg) by mouth 2 times daily    Hypertriglyceridemia       SUBLINGUAL IMMUNOTHERAPY (SLIT)     1 vial    Start SLIT per standard buildup protocol    Perennial allergic rhinitis with seasonal variation

## 2018-06-22 NOTE — TELEPHONE ENCOUNTER
Called patient to notify him that Maryjane Iniguez CNP, sent in a prescription for Allegra to [facility] in [place], and discontinued the cetirizine (patient request as cetirizine was not effective).  Patient verbalized understanding.    Sudha Sagastume RN

## 2018-06-22 NOTE — TELEPHONE ENCOUNTER
Patient was here today for SLIT education and first dose.  He stated that he is not finding the cetirizine to be helpful.  He is wondering if you can prescribe an alternative antihistamine?  If so, he uses the [facility] in [place].  Please advise.  I told patient we would contact him when/if you change his medication so he is aware he should pick it up at the pharmacy.  Thank you!    Sudha Sagastume RN

## 2018-07-20 DIAGNOSIS — E78.1 HYPERTRIGLYCERIDEMIA: ICD-10-CM

## 2018-07-20 NOTE — TELEPHONE ENCOUNTER
lopid      Last Written Prescription Date:  3/20/18  Last Fill Quantity: 60,   # refills: 1  Last Office Visit: 7/15/18  Future Office visit:  none

## 2018-07-23 RX ORDER — GEMFIBROZIL 600 MG/1
TABLET, FILM COATED ORAL
Qty: 60 TABLET | Refills: 1 | Status: SHIPPED | OUTPATIENT
Start: 2018-07-23 | End: 2019-05-23

## 2019-05-23 ENCOUNTER — MYC REFILL (OUTPATIENT)
Dept: FAMILY MEDICINE | Facility: OTHER | Age: 44
End: 2019-05-23

## 2019-05-23 DIAGNOSIS — J32.9 CHRONIC SINUSITIS, UNSPECIFIED LOCATION: ICD-10-CM

## 2019-05-23 DIAGNOSIS — G43.009 MIGRAINE WITHOUT AURA AND WITHOUT STATUS MIGRAINOSUS, NOT INTRACTABLE: ICD-10-CM

## 2019-05-23 DIAGNOSIS — M50.30 DDD (DEGENERATIVE DISC DISEASE), CERVICAL: ICD-10-CM

## 2019-05-24 RX ORDER — FLUTICASONE PROPIONATE 50 MCG
1-2 SPRAY, SUSPENSION (ML) NASAL DAILY
Qty: 1 G | Refills: 0 | Status: SHIPPED | OUTPATIENT
Start: 2019-05-24 | End: 2020-01-29

## 2019-05-24 NOTE — TELEPHONE ENCOUNTER
Not on protocol.    cyclobenzaprine (FLEXERIL) 5 MG tablet      Last Written Prescription Date:  3/15/18  Last Fill Quantity: 30,   # refills: 1  Last Office Visit: 3/15/18  Future Office visit:       Routing refill request to provider for review/approval because:  Drug not on the [facility], [facility] or [place] refill protocol or controlled substance

## 2019-05-28 RX ORDER — CYCLOBENZAPRINE HCL 5 MG
5 TABLET ORAL 2 TIMES DAILY PRN
Qty: 15 TABLET | Refills: 0 | Status: SHIPPED | OUTPATIENT
Start: 2019-05-28 | End: 2020-02-24

## 2019-08-20 ENCOUNTER — HOSPITAL ENCOUNTER (EMERGENCY)
Facility: HOSPITAL | Age: 44
Discharge: HOME | End: 2019-08-20
Attending: PHYSICIAN ASSISTANT | Admitting: PHYSICIAN ASSISTANT
Payer: COMMERCIAL

## 2019-08-20 VITALS
TEMPERATURE: 97.9 F | OXYGEN SATURATION: 97 % | DIASTOLIC BLOOD PRESSURE: 84 MMHG | SYSTOLIC BLOOD PRESSURE: 139 MMHG | RESPIRATION RATE: 16 BRPM | HEART RATE: 82 BPM

## 2019-08-20 DIAGNOSIS — L08.9 INFECTED PUNCTURE WOUND: ICD-10-CM

## 2019-08-20 DIAGNOSIS — T14.8XXA INFECTED PUNCTURE WOUND: ICD-10-CM

## 2019-08-20 PROCEDURE — 90471 IMMUNIZATION ADMIN: CPT

## 2019-08-20 PROCEDURE — 99213 OFFICE O/P EST LOW 20 MIN: CPT | Mod: Z6 | Performed by: PHYSICIAN ASSISTANT

## 2019-08-20 PROCEDURE — 25000128 H RX IP 250 OP 636: Performed by: PHYSICIAN ASSISTANT

## 2019-08-20 PROCEDURE — 90715 TDAP VACCINE 7 YRS/> IM: CPT | Performed by: PHYSICIAN ASSISTANT

## 2019-08-20 PROCEDURE — G0463 HOSPITAL OUTPT CLINIC VISIT: HCPCS | Mod: 25

## 2019-08-20 RX ORDER — DOXYCYCLINE 100 MG/1
100 CAPSULE ORAL 2 TIMES DAILY
Qty: 28 CAPSULE | Refills: 0 | Status: SHIPPED | OUTPATIENT
Start: 2019-08-20 | End: 2020-01-29

## 2019-08-20 RX ADMIN — CLOSTRIDIUM TETANI TOXOID ANTIGEN (FORMALDEHYDE INACTIVATED), CORYNEBACTERIUM DIPHTHERIAE TOXOID ANTIGEN (FORMALDEHYDE INACTIVATED), BORDETELLA PERTUSSIS TOXOID ANTIGEN (GLUTARALDEHYDE INACTIVATED), BORDETELLA PERTUSSIS FILAMENTOUS HEMAGGLUTININ ANTIGEN (FORMALDEHYDE INACTIVATED), BORDETELLA PERTUSSIS PERTACTIN ANTIGEN, AND BORDETELLA PERTUSSIS FIMBRIAE 2/3 ANTIGEN 0.5 ML: 5; 2; 2.5; 5; 3; 5 INJECTION, SUSPENSION INTRAMUSCULAR at 18:55
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
## 2019-08-20 NOTE — ED AVS SNAPSHOT
[facility]  [address] [place] [id number]  Phone:  [phone]                                    Gerson Powers   MRN: [id number]    Department:  [facility]   Date of Visit:  8/20/2019           After Visit Summary Signature Page    I have received my discharge instructions, and my questions have been answered. I have discussed any challenges I see with this plan with the nurse or doctor.    ..........................................................................................................................................  Patient/Patient Representative Signature      ..........................................................................................................................................  Patient Representative Print Name and Relationship to Patient    ..................................................               ................................................  Date                                   Time    ..........................................................................................................................................  Reviewed by Signature/Title    ...................................................              ..............................................  Date                                               Time          22EPIC Rev 08/18

## 2019-08-20 NOTE — ED TRIAGE NOTES
Pt is here with c/o nail gun injury with a nail that went in 1 to 1 1/2 inch into skin. Pt was not seen but was given 3 day dose of  Augmentin from a nurse that works in the ER states its his mom. Wound presents red and warm and it itches.

## 2019-08-21 ASSESSMENT — ENCOUNTER SYMPTOMS
FEVER: 0
WOUND: 1

## 2019-08-21 NOTE — DISCHARGE INSTRUCTIONS
You were seen today for infected puncture wound.    -Take doxycycline twice daily for 14 days; know that this does make you prone to sunburn so make sure to cover up or wear long clothing if outside  -Monitor carefully; if having significant worsening including fevers >100.4F, extension of redness, increased pain, any difficulties moving leg return for evaluation.    Thank you

## 2020-01-07 ENCOUNTER — TELEPHONE (OUTPATIENT)
Dept: FAMILY MEDICINE | Facility: OTHER | Age: 45
End: 2020-01-07

## 2020-01-07 NOTE — TELEPHONE ENCOUNTER
To: Dr. Clarke nurse  Patient has physical appt set for 01/30/2020 with Dr. Clarke.  He is planning to come in the morning of his appt to do the labs so he does not have to go without eating all morning.  Place orders please.  Thank you

## 2020-01-29 ENCOUNTER — HOSPITAL ENCOUNTER (EMERGENCY)
Facility: HOSPITAL | Age: 45
Discharge: HOME | End: 2020-01-29
Attending: NURSE PRACTITIONER | Admitting: NURSE PRACTITIONER
Payer: COMMERCIAL

## 2020-01-29 VITALS
SYSTOLIC BLOOD PRESSURE: 134 MMHG | DIASTOLIC BLOOD PRESSURE: 94 MMHG | RESPIRATION RATE: 18 BRPM | HEART RATE: 83 BPM | OXYGEN SATURATION: 97 % | TEMPERATURE: 98.2 F

## 2020-01-29 DIAGNOSIS — J01.90 ACUTE SINUSITIS WITH SYMPTOMS > 10 DAYS: ICD-10-CM

## 2020-01-29 PROCEDURE — G0463 HOSPITAL OUTPT CLINIC VISIT: HCPCS

## 2020-01-29 PROCEDURE — 99213 OFFICE O/P EST LOW 20 MIN: CPT | Mod: Z6 | Performed by: NURSE PRACTITIONER

## 2020-01-29 ASSESSMENT — ENCOUNTER SYMPTOMS
DIAPHORESIS: 0
EYES NEGATIVE: 1
RESPIRATORY NEGATIVE: 1
CARDIOVASCULAR NEGATIVE: 1
SINUS PAIN: 1
RHINORRHEA: 1
TROUBLE SWALLOWING: 0
SORE THROAT: 0
SINUS PRESSURE: 1

## 2020-01-29 NOTE — ED AVS SNAPSHOT
[facility]  [address] [place] [id number]  Phone:  [phone]                                    Gerson Powers   MRN: [id number]    Department:  [facility]   Date of Visit:  1/29/2020           After Visit Summary Signature Page    I have received my discharge instructions, and my questions have been answered. I have discussed any challenges I see with this plan with the nurse or doctor.    ..........................................................................................................................................  Patient/Patient Representative Signature      ..........................................................................................................................................  Patient Representative Print Name and Relationship to Patient    ..................................................               ................................................  Date                                   Time    ..........................................................................................................................................  Reviewed by Signature/Title    ...................................................              ..............................................  Date                                               Time          22EPIC Rev 08/18

## 2020-01-30 NOTE — ED TRIAGE NOTES
Patient presents today with c/o sinus symptoms.   Ongoing since around [place] (one month)  Experiencing sinus pain/pressure, left ear pain, nasal congestion, headaches, intermittent fevers,chills, sweats.   Denies any cough, rhinorrhea, nausea, vomiting, diarrhea.   Normal appetite.   Sinus treatments / vitamin C / sudafed - minimal relief.

## 2020-01-30 NOTE — DISCHARGE INSTRUCTIONS
Complete antibiotics as directed.    Recommend nasal saline irrigations, nasal steroids, ibuprofen.    Follow-up in clinic if no improvement over the next few days, sooner with any worsening symptoms.
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
## 2020-02-24 ENCOUNTER — HEALTH MAINTENANCE LETTER (OUTPATIENT)
Age: 45
End: 2020-02-24

## 2020-02-24 ENCOUNTER — OFFICE VISIT (OUTPATIENT)
Dept: FAMILY MEDICINE | Facility: OTHER | Age: 45
End: 2020-02-24
Attending: FAMILY MEDICINE
Payer: COMMERCIAL

## 2020-02-24 VITALS
SYSTOLIC BLOOD PRESSURE: 114 MMHG | WEIGHT: 201.4 LBS | DIASTOLIC BLOOD PRESSURE: 72 MMHG | BODY MASS INDEX: 27.28 KG/M2 | OXYGEN SATURATION: 98 % | HEART RATE: 64 BPM | TEMPERATURE: 97.1 F | RESPIRATION RATE: 20 BRPM | HEIGHT: 72 IN

## 2020-02-24 DIAGNOSIS — M50.30 DDD (DEGENERATIVE DISC DISEASE), CERVICAL: ICD-10-CM

## 2020-02-24 DIAGNOSIS — Z00.00 ROUTINE GENERAL MEDICAL EXAMINATION AT A HEALTH CARE FACILITY: Primary | ICD-10-CM

## 2020-02-24 DIAGNOSIS — R74.8 ELEVATED LIVER ENZYMES: ICD-10-CM

## 2020-02-24 DIAGNOSIS — E78.1 HYPERTRIGLYCERIDEMIA: ICD-10-CM

## 2020-02-24 DIAGNOSIS — G43.009 MIGRAINE WITHOUT AURA AND WITHOUT STATUS MIGRAINOSUS, NOT INTRACTABLE: ICD-10-CM

## 2020-02-24 LAB
ALBUMIN SERPL-MCNC: 3.9 G/DL (ref 3.4–5)
ALP SERPL-CCNC: 94 U/L (ref 40–150)
ALT SERPL W P-5'-P-CCNC: 84 U/L (ref 0–70)
ANION GAP SERPL CALCULATED.3IONS-SCNC: 6 MMOL/L (ref 3–14)
AST SERPL W P-5'-P-CCNC: 35 U/L (ref 0–45)
BILIRUB SERPL-MCNC: 0.6 MG/DL (ref 0.2–1.3)
BUN SERPL-MCNC: 15 MG/DL (ref 7–30)
CALCIUM SERPL-MCNC: 8.7 MG/DL (ref 8.5–10.1)
CHLORIDE SERPL-SCNC: 107 MMOL/L (ref 94–109)
CHOLEST SERPL-MCNC: 209 MG/DL
CO2 SERPL-SCNC: 27 MMOL/L (ref 20–32)
CREAT SERPL-MCNC: 0.81 MG/DL (ref 0.66–1.25)
GFR SERPL CREATININE-BSD FRML MDRD: >90 ML/MIN/{1.73_M2}
GLUCOSE SERPL-MCNC: 106 MG/DL (ref 70–99)
HDLC SERPL-MCNC: 37 MG/DL
LDLC SERPL CALC-MCNC: 134 MG/DL
NONHDLC SERPL-MCNC: 172 MG/DL
POTASSIUM SERPL-SCNC: 4 MMOL/L (ref 3.4–5.3)
PROT SERPL-MCNC: 7.7 G/DL (ref 6.8–8.8)
SODIUM SERPL-SCNC: 140 MMOL/L (ref 133–144)
TRIGL SERPL-MCNC: 190 MG/DL

## 2020-02-24 PROCEDURE — 99396 PREV VISIT EST AGE 40-64: CPT | Performed by: FAMILY MEDICINE

## 2020-02-24 PROCEDURE — 80053 COMPREHEN METABOLIC PANEL: CPT | Performed by: FAMILY MEDICINE

## 2020-02-24 PROCEDURE — 36415 COLL VENOUS BLD VENIPUNCTURE: CPT | Performed by: FAMILY MEDICINE

## 2020-02-24 PROCEDURE — 80061 LIPID PANEL: CPT | Performed by: FAMILY MEDICINE

## 2020-02-24 RX ORDER — CYCLOBENZAPRINE HCL 5 MG
TABLET ORAL
Qty: 30 TABLET | Refills: 3 | Status: SHIPPED | OUTPATIENT
Start: 2020-02-24 | End: 2021-11-01

## 2020-02-24 SDOH — SOCIAL STABILITY: SOCIAL INSECURITY
WITHIN THE LAST YEAR, HAVE YOU BEEN KICKED, HIT, SLAPPED, OR OTHERWISE PHYSICALLY HURT BY YOUR PARTNER OR EX-PARTNER?: NO

## 2020-02-24 SDOH — SOCIAL STABILITY: SOCIAL INSECURITY
WITHIN THE LAST YEAR, HAVE TO BEEN RAPED OR FORCED TO HAVE ANY KIND OF SEXUAL ACTIVITY BY YOUR PARTNER OR EX-PARTNER?: NO

## 2020-02-24 SDOH — HEALTH STABILITY: PHYSICAL HEALTH: ON AVERAGE, HOW MANY DAYS PER WEEK DO YOU ENGAGE IN MODERATE TO STRENUOUS EXERCISE (LIKE A BRISK WALK)?: 0 DAYS

## 2020-02-24 SDOH — HEALTH STABILITY: MENTAL HEALTH: HOW OFTEN DO YOU HAVE A DRINK CONTAINING ALCOHOL?: 2-3 TIMES A WEEK

## 2020-02-24 SDOH — SOCIAL STABILITY: SOCIAL INSECURITY: WITHIN THE LAST YEAR, HAVE YOU BEEN AFRAID OF YOUR PARTNER OR EX-PARTNER?: NO

## 2020-02-24 SDOH — HEALTH STABILITY: MENTAL HEALTH: HOW MANY STANDARD DRINKS CONTAINING ALCOHOL DO YOU HAVE ON A TYPICAL DAY?: 1 OR 2

## 2020-02-24 SDOH — HEALTH STABILITY: PHYSICAL HEALTH: ON AVERAGE, HOW MANY MINUTES DO YOU ENGAGE IN EXERCISE AT THIS LEVEL?: 0 MIN

## 2020-02-24 SDOH — SOCIAL STABILITY: SOCIAL INSECURITY: WITHIN THE LAST YEAR, HAVE YOU BEEN HUMILIATED OR EMOTIONALLY ABUSED IN OTHER WAYS BY YOUR PARTNER OR EX-PARTNER?: NO

## 2020-02-24 ASSESSMENT — PATIENT HEALTH QUESTIONNAIRE - PHQ9
5. POOR APPETITE OR OVEREATING: NOT AT ALL
SUM OF ALL RESPONSES TO PHQ QUESTIONS 1-9: 2

## 2020-02-24 ASSESSMENT — ANXIETY QUESTIONNAIRES
3. WORRYING TOO MUCH ABOUT DIFFERENT THINGS: NOT AT ALL
2. NOT BEING ABLE TO STOP OR CONTROL WORRYING: NOT AT ALL
IF YOU CHECKED OFF ANY PROBLEMS ON THIS QUESTIONNAIRE, HOW DIFFICULT HAVE THESE PROBLEMS MADE IT FOR YOU TO DO YOUR WORK, TAKE CARE OF THINGS AT HOME, OR GET ALONG WITH OTHER PEOPLE: NOT DIFFICULT AT ALL
5. BEING SO RESTLESS THAT IT IS HARD TO SIT STILL: NOT AT ALL
GAD7 TOTAL SCORE: 1
7. FEELING AFRAID AS IF SOMETHING AWFUL MIGHT HAPPEN: NOT AT ALL
6. BECOMING EASILY ANNOYED OR IRRITABLE: SEVERAL DAYS
1. FEELING NERVOUS, ANXIOUS, OR ON EDGE: NOT AT ALL

## 2020-02-24 ASSESSMENT — PAIN SCALES - GENERAL: PAINLEVEL: NO PAIN (0)

## 2020-02-24 ASSESSMENT — MIFFLIN-ST. JEOR: SCORE: 1855.51

## 2020-02-24 NOTE — NURSING NOTE
"Chief Complaint   Patient presents with     Physical       Initial /72 (BP Location: Left arm, Patient Position: Chair, Cuff Size: Adult Large)   Pulse 64   Temp 97.1  F (36.2  C) (Tympanic)   Resp 20   Ht 1.835 m (6' 0.25\")   Wt 91.4 kg (201 lb 6.4 oz)   SpO2 98%   BMI 27.13 kg/m   Estimated body mass index is 27.13 kg/m  as calculated from the following:    Height as of this encounter: 1.835 m (6' 0.25\").    Weight as of this encounter: 91.4 kg (201 lb 6.4 oz).  Medication Reconciliation: complete  Sameera Fang LPN    "

## 2020-02-25 ASSESSMENT — ANXIETY QUESTIONNAIRES: GAD7 TOTAL SCORE: 1

## 2020-02-27 ENCOUNTER — MYC MEDICAL ADVICE (OUTPATIENT)
Dept: FAMILY MEDICINE | Facility: OTHER | Age: 45
End: 2020-02-27

## 2020-02-27 DIAGNOSIS — Z12.11 SPECIAL SCREENING FOR MALIGNANT NEOPLASMS, COLON: ICD-10-CM

## 2020-02-27 DIAGNOSIS — Z02.89 ENCOUNTER FOR FIREFIGHTER HEALTH EXAMINATION: Primary | ICD-10-CM

## 2020-03-03 NOTE — TELEPHONE ENCOUNTER
Orders placed -- he will get a call from general surg and respiratory otherwise normal lab and xray orders/he can come anytime

## 2020-03-05 ENCOUNTER — TELEPHONE (OUTPATIENT)
Dept: SURGERY | Facility: OTHER | Age: 45
End: 2020-03-05

## 2020-03-05 NOTE — TELEPHONE ENCOUNTER
Patient contacted via telephone regarding a referral sent by Dr Clarke for a meet and greet colonoscopy.  Patient has been cleared by the surgery education department to have a meet and greet colonoscopy.  Message left with the direct line to the [facility] surgery department nurses to contact regarding setting up this procedure.    CAROLYN MIXON LPN

## 2020-03-06 ENCOUNTER — TELEPHONE (OUTPATIENT)
Dept: SURGERY | Facility: OTHER | Age: 45
End: 2020-03-06

## 2020-03-06 DIAGNOSIS — Z12.11 ENCOUNTER FOR SCREENING COLONOSCOPY: Primary | ICD-10-CM

## 2020-03-06 NOTE — LETTER
"March 9, 2020          Gerson Powers  [address]   [place] [address]    Dear Gerson,   We want your Colonoscopy to be as pleasant as possible. Please review the instructions below. If you have any questions, please contact us at any of the following numbers:     [facility] Health Unit Coordinator: [phone]  Clinic Nurse (Sylvia): [phone]  Surgery Education Nurse: [phone]    Date of Procedure: March 23, 2020 with Dr. Baker  Admit time: Hospital Surgery will call you the day before your procedure by 5pm with your arrival time. If your surgery is on Monday, expect a call on Friday.  If you are not contacted by 5 pm you may call admitting at [phone].   After hours or on weekends, call [phone] to postpone.   Call the clinic nurse if you become ill within 1 week of your procedure to reschedule.       7 DAYS BEFORE THE EXAM:   prescriptions at your pharmacy as soon as possible. (call ahead if more than 1 week)  Call the Surgery Education Nurse at [phone] and have a medication list ready.   Do not take Aspirin or NSAIDS (Ibuprofen, Celebrex, Naproxen, etc) 7 days before surgery.   Stop fiber supplements, vitamins, iron, and herbals. Do not eat any corn, nuts or seeds.   You may continue your 81 mg Aspirin.If you are prescribed blood thinners or insulin, talk to your primary care provider instructions on these medications.    2 DAYS BEFORE THE EXAM:   Low fiber diet. See the list of low fiber foods on page 3 of the \"Split-Dose SuPrep\" packet.   Drink 4-6 large glasses of sports drink today and tomorrow. Avoid red and purple.    1 DAY BEFORE THE EXAM:  No solid food or milk products after 12:01am. Drink only clear liquids all day.   See list of clear liquids on page 2 of \"Split-Dose SuPrep\" packet. No red, purple or alcohol.          AT 6:00 PM THE EVENING PRIOR TO PROCEDURE:  Pour one bottle of SuPrep liquid into the mixing container. Add cool water to the 16 oz line, " mix and drink. Follow with two 16 oz. glasses of water in the next hour. Stay near a toilet.    DAY OF COLONOSCOPY:          6 HOURS PRIOR TO THE EXAM (set an alarm):  Repeat the previous instructions with the 2nd bottle of SuPrep followed by 2 glasses of water.   Continue clear liquids until 2 hours prior to exam. If you must take medication, take it with a sip of water.  Do not take diabetes medicine by mouth until after your exam. If you have asthma, bring your inhaler to surgery.  Shower before arrival and wear clean, comfortable clothes.   No jewelry, make-up, nail polish, hairspray, lotions, or perfumes.   [place] in Admitting through the [place] Entrance.  You must have a responsible adult to drive and stay with you for 4 hours at home or you will be cancelled.     TIPS FOR COLON CLEANSING BEFORE YOUR COLONOSCOPY  To get accurate results from your exam, your colon must be completely empty or you may need to repeat the colon prep and exam. If you followed instructions and your stool is clear or yellow liquid, you are ready. If you are not sure if your colon is clean, please call the clinic nurse.     You may use tucks wipes, hemorrhoid treatments, hydrocortisone cream, or alcohol-free baby wipes to ease anal irritation. You may also use Vaseline to help protect the skin.     You will have loose watery stools and may also have chills. Expect to feel discomfort, bloating and nausea until the stool clears from your colon. Dress for comfort.     If SuPrep is not covered by insurance and you would like an alternate prep, you or your pharmacy may call the nurse to request a new prescription. The dietary instructions are the same for both preps. Take Dulcolax 5mg at bedtime 2 nights before procedure and 3pm day before exam. Drink 1/2 of the the Golytely at 6pm day before exam 1  8 oz glass every 15 minutes. Repeat with 2nd 1/2 of Golytely 6 hours prior to exam.            Sincerely,        Surgery Fanta Dunlap,  MD

## 2020-03-09 ENCOUNTER — PREP FOR PROCEDURE (OUTPATIENT)
Dept: SURGERY | Facility: OTHER | Age: 45
End: 2020-03-09

## 2020-03-09 ENCOUNTER — HOSPITAL ENCOUNTER (OUTPATIENT)
Facility: HOSPITAL | Age: 45
End: 2020-03-09
Attending: SURGERY | Admitting: SURGERY
Payer: COMMERCIAL

## 2020-03-09 DIAGNOSIS — Z12.11 SPECIAL SCREENING FOR MALIGNANT NEOPLASMS, COLON: ICD-10-CM

## 2020-03-09 DIAGNOSIS — Z02.89 ENCOUNTER FOR FIREFIGHTER HEALTH EXAMINATION: ICD-10-CM

## 2020-03-09 DIAGNOSIS — Z02.89 ENCOUNTER FOR FIREFIGHTER HEALTH EXAMINATION: Primary | ICD-10-CM

## 2020-03-09 RX ORDER — SODIUM, POTASSIUM,MAG SULFATES 17.5-3.13G
SOLUTION, RECONSTITUTED, ORAL ORAL
Qty: 2 BOTTLE | Refills: 0 | Status: SHIPPED | OUTPATIENT
Start: 2020-03-09 | End: 2021-10-20

## 2020-03-09 NOTE — TELEPHONE ENCOUNTER
Referral received for colonoscopy.   This patient was  approved by surgery education nurses for meet and greet colonoscopy and will NOT NEEDED AT THIS TIME need a preop or consult appointment.   Patient scheduled for colonoscopy on MARCH 23, 2020 with Dr. ARCINIEGA at [facility] with SUPREP bowel prep.   Instructions given via phone and instructions mailed to patient with surgery handbook.     Please sign RX for colon prep in this encounter.

## 2020-03-11 ENCOUNTER — TELEPHONE (OUTPATIENT)
Dept: SURGERY | Facility: OTHER | Age: 45
End: 2020-03-11

## 2020-03-11 NOTE — TELEPHONE ENCOUNTER
Message received from Manuela in patient financial services indicating that for this patient screening colonoscopy is not covered until age 50 and inquiring if this is for a reason other than screening.    This patient is a  and sent Dr. Clarke a my-chart message with an attached letter from his employer with a list of screenings recommended for firefighters. 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    This was discussed with Manuela and letter was faxed to her office for review.

## 2020-03-17 ENCOUNTER — TELEPHONE (OUTPATIENT)
Dept: SURGERY | Facility: OTHER | Age: 45
End: 2020-03-17

## 2020-03-17 NOTE — TELEPHONE ENCOUNTER
We will cancel , patient I guess spoke with our financial department and this will not be covered due to it is a recommendation from his employer. We will cancel and not reschedule unless he hears otherwise and he will contact us.

## 2020-11-30 ENCOUNTER — TELEPHONE (OUTPATIENT)
Dept: NURSING | Facility: CLINIC | Age: 45
End: 2020-11-30

## 2020-11-30 ENCOUNTER — RESULTS ONLY (OUTPATIENT)
Dept: LAB | Age: 45
End: 2020-11-30

## 2020-11-30 LAB
LABORATORY COMMENT REPORT: ABNORMAL
SARS-COV-2 RNA SPEC QL NAA+PROBE: POSITIVE
SPECIMEN SOURCE: ABNORMAL

## 2020-11-30 PROCEDURE — C9803 HOPD COVID-19 SPEC COLLECT: HCPCS

## 2020-11-30 NOTE — TELEPHONE ENCOUNTER
"Coronavirus (COVID-19) Notification    Caller Name (Patient, parent, daughter/son, grandparent, etc)  Patient    Reason for call  Notify of Positive Coronavirus (COVID-19) lab results, assess symptoms,  review [employer] recommendations    Lab Result    Lab test:  2019-nCoV rRt-PCR or SARS-CoV-2 PCR    Oropharyngeal AND/OR nasopharyngeal swabs is POSITIVE for 2019-nCoV RNA/SARS-COV-2 PCR (COVID-19 virus)    RN Recommendations/Instructions per [employer] Coronavirus COVID-19 recommendations    Brief introduction script  Introduce self then review script:  \"I am calling on behalf of [employer].  We were notified that your Coronavirus test (COVID-19) for was POSITIVE for the virus.  I have some information to relay to you but first I wanted to mention that the [place] Dept of Health will be contacting you shortly [it's possible MD already called Patient] to talk to you more about how you are feeling and other people you have had contact with who might now also have the virus.  Also, [employer] is Partnering with the [facility] for Covid-19 research, you may be contacted directly by research staff.\"    Assessment (Inquire about Patient's current symptoms)   Assessment   Current Symptoms at time of phone call: (if no symptoms, document No symptoms] Fever, fatigue   Symptoms onset (if applicable) 3 days     If at time of call, Patients symptoms hare worsened, the Patient should contact 911 or have someone drive them to Emergency Dept promptly:      If Patient calling 911, inform 911 personal that you have tested positive for the Coronavirus (COVID-19).  Place mask on and await 911 to arrive.    If Emergency Dept, If possible, please have another adult drive you to the Emergency Dept but you need to wear mask when in contact with other people.      Review information with Patient    Your result was positive. This means you have COVID-19 (coronavirus).  We have sent you a letter that reviews the " information that I'll be reviewing with you now.    How can I protect others?    If you have symptoms: stay home and away from others (self-isolate) until:    You've had no fever--and no medicine that reduces fever--for 1 full day (24 hours). And       Your other symptoms have gotten better. For example, your cough or breathing has improved. And     At least 10 days have passed since your symptoms started. (If you've been told by a doctor that you have a weak immune system, wait 20 days.)     If you don't have symptoms: Stay home and away from others (self-isolate) until at least 10 days have passed since your first positive COVID-19 test. (Date test collected)    During this time:    Stay in your own room, including for meals. Use your own bathroom if you can.    Stay away from others in your home. No hugging, kissing or shaking hands. No visitors.     Don't go to work, school or anywhere else.     Clean  high touch  surfaces often (doorknobs, counters, handles, etc.). Use a household cleaning spray or wipes. You'll find a full list on the EPA website at www.epa.gov/pesticide-registration/list-n-disinfectants-use-against-sars-cov-2.     Cover your mouth and nose with a mask, tissue or other face covering to avoid spreading germs.    Wash your hands and face often with soap and water.    Caregivers in these groups are at risk for severe illness due to COVID-19:  o People 65 years and older  o People who live in a nursing home or long-term care facility  o People with chronic disease (lung, heart, cancer, diabetes, kidney, liver, immunologic)  o People who have a weakened immune system, including those who:  - Are in cancer treatment  - Take medicine that weakens the immune system, such as corticosteroids  - Had a bone marrow or organ transplant  - Have an immune deficiency  - Have poorly controlled HIV or AIDS  - Are obese (body mass index of 40 or higher)  - Smoke regularly    Caregivers should wear gloves while  washing dishes, handling laundry and cleaning bedrooms and bathrooms.    Wash and dry laundry with special caution. Don't shake dirty laundry, and use the warmest water setting you can.    If you have a weakened immune system, ask your doctor about other actions you should take.    For more tips, go to www.cdc.gov/coronavirus/2019-ncov/downloads/10Things.pdf.    You should not go back to work until you meet the guidelines above for ending your home isolation. You don't need to be retested for COVID-19 before going back to work--studies show that you won't spread the virus if it's been at least 10 days since your symptoms started (or 20 days, if you have a weak immune system).    Employers: This document serves as formal notice of your employee's medical guidelines for going back to work. They must meet the above guidelines before going back to work in person.    How can I take care of myself?    1. Get lots of rest. Drink extra fluids (unless a doctor has told you not to).    2. Take Tylenol (acetaminophen) for fever or pain. If you have liver or kidney problems, ask your family doctor if it's okay to take Tylenol.     Take either:     650 mg (two 325 mg pills) every 4 to 6 hours, or     1,000 mg (two 500 mg pills) every 8 hours as needed.     Note: Don't take more than 3,000 mg in one day. Acetaminophen is found in many medicines (both prescribed and over-the-counter medicines). Read all labels to be sure you don't take too much.    For children, check the Tylenol bottle for the right dose (based on their age or weight).    3. If you have other health problems (like cancer, heart failure, an organ transplant or severe kidney disease): Call your specialty clinic if you don't feel better in the next 2 days.    4. Know when to call 911: Emergency warning signs include:    Trouble breathing or shortness of breath    Pain or pressure in the chest that doesn't go away    Feeling confused like you haven't felt before, or  not being able to wake up    Bluish-colored lips or face    5. Sign up for [employer]. We know it's scary to hear that you have COVID-19. We want to track your symptoms to make sure you're okay over the next 2 weeks. Please look for an email from [employer]--this is a free, online program that we'll use to keep in touch. To sign up, follow the link in the email. Learn more at [email or web address].    Where can I get more information?    [facility]: [email or web address]    Coronavirus Basics: www.health.[email or web address].[place]./diseases/coronavirus/basics.html    What to Do If You're Sick: www.cdc.gov/coronavirus/2019-ncov/about/steps-when-sick.html    Ending Home Isolation: www.cdc.gov/coronavirus/2019-ncov/hcp/disposition-in-home-patients.html     Caring for Someone with COVID-19: www.cdc.gov/coronavirus/2019-ncov/if-you-are-sick/care-for-someone.html     [facility] clinical trials (COVID-19 research studies): clinicalaffairs.[facility]-clinical-trials     A Positive COVID-19 letter will be sent via [employer] or the mail. (Exception, no letters sent to Presurgerical/Preprocedure Patients)    Karie Gomes RN

## 2020-12-13 ENCOUNTER — HEALTH MAINTENANCE LETTER (OUTPATIENT)
Age: 45
End: 2020-12-13

## 2021-04-17 ENCOUNTER — HEALTH MAINTENANCE LETTER (OUTPATIENT)
Age: 46
End: 2021-04-17

## 2021-09-26 ENCOUNTER — HEALTH MAINTENANCE LETTER (OUTPATIENT)
Age: 46
End: 2021-09-26

## 2021-10-20 ENCOUNTER — MYC MEDICAL ADVICE (OUTPATIENT)
Dept: FAMILY MEDICINE | Facility: OTHER | Age: 46
End: 2021-10-20

## 2021-10-20 DIAGNOSIS — Z11.59 NEED FOR HEPATITIS C SCREENING TEST: ICD-10-CM

## 2021-10-20 DIAGNOSIS — E78.1 HYPERTRIGLYCERIDEMIA: Primary | ICD-10-CM

## 2021-10-20 DIAGNOSIS — Z11.4 SCREENING FOR HIV (HUMAN IMMUNODEFICIENCY VIRUS): ICD-10-CM

## 2021-11-01 ENCOUNTER — OFFICE VISIT (OUTPATIENT)
Dept: FAMILY MEDICINE | Facility: OTHER | Age: 46
End: 2021-11-01
Attending: FAMILY MEDICINE
Payer: COMMERCIAL

## 2021-11-01 ENCOUNTER — ANCILLARY PROCEDURE (OUTPATIENT)
Dept: GENERAL RADIOLOGY | Facility: OTHER | Age: 46
End: 2021-11-01
Attending: FAMILY MEDICINE
Payer: COMMERCIAL

## 2021-11-01 VITALS
RESPIRATION RATE: 16 BRPM | OXYGEN SATURATION: 97 % | BODY MASS INDEX: 28.88 KG/M2 | WEIGHT: 206.3 LBS | HEART RATE: 74 BPM | HEIGHT: 71 IN | TEMPERATURE: 97.3 F | DIASTOLIC BLOOD PRESSURE: 72 MMHG | SYSTOLIC BLOOD PRESSURE: 132 MMHG

## 2021-11-01 DIAGNOSIS — J30.89 SEASONAL ALLERGIC RHINITIS DUE TO OTHER ALLERGIC TRIGGER: ICD-10-CM

## 2021-11-01 DIAGNOSIS — Z86.16 HISTORY OF COVID-19: ICD-10-CM

## 2021-11-01 DIAGNOSIS — R74.8 ELEVATED LIVER ENZYMES: ICD-10-CM

## 2021-11-01 DIAGNOSIS — E78.1 HYPERTRIGLYCERIDEMIA: ICD-10-CM

## 2021-11-01 DIAGNOSIS — Z11.59 NEED FOR HEPATITIS C SCREENING TEST: ICD-10-CM

## 2021-11-01 DIAGNOSIS — M50.30 DDD (DEGENERATIVE DISC DISEASE), CERVICAL: ICD-10-CM

## 2021-11-01 DIAGNOSIS — Z11.4 SCREENING FOR HIV (HUMAN IMMUNODEFICIENCY VIRUS): ICD-10-CM

## 2021-11-01 DIAGNOSIS — G43.009 MIGRAINE WITHOUT AURA AND WITHOUT STATUS MIGRAINOSUS, NOT INTRACTABLE: Primary | ICD-10-CM

## 2021-11-01 LAB
ALBUMIN SERPL-MCNC: 4 G/DL (ref 3.4–5)
ALP SERPL-CCNC: 97 U/L (ref 40–150)
ALT SERPL W P-5'-P-CCNC: 75 U/L (ref 0–70)
ANION GAP SERPL CALCULATED.3IONS-SCNC: 2 MMOL/L (ref 3–14)
AST SERPL W P-5'-P-CCNC: 31 U/L (ref 0–45)
BILIRUB SERPL-MCNC: 0.5 MG/DL (ref 0.2–1.3)
BUN SERPL-MCNC: 16 MG/DL (ref 7–30)
CALCIUM SERPL-MCNC: 8.9 MG/DL (ref 8.5–10.1)
CHLORIDE BLD-SCNC: 108 MMOL/L (ref 94–109)
CHOLEST SERPL-MCNC: 226 MG/DL
CO2 SERPL-SCNC: 28 MMOL/L (ref 20–32)
CREAT SERPL-MCNC: 0.82 MG/DL (ref 0.66–1.25)
FASTING STATUS PATIENT QL REPORTED: NO
GFR SERPL CREATININE-BSD FRML MDRD: >90 ML/MIN/1.73M2
GLUCOSE BLD-MCNC: 99 MG/DL (ref 70–99)
HDLC SERPL-MCNC: 37 MG/DL
LDLC SERPL CALC-MCNC: 144 MG/DL
NONHDLC SERPL-MCNC: 189 MG/DL
POTASSIUM BLD-SCNC: 3.9 MMOL/L (ref 3.4–5.3)
PROT SERPL-MCNC: 8.3 G/DL (ref 6.8–8.8)
SODIUM SERPL-SCNC: 138 MMOL/L (ref 133–144)
TRIGL SERPL-MCNC: 226 MG/DL

## 2021-11-01 PROCEDURE — 71046 X-RAY EXAM CHEST 2 VIEWS: CPT | Mod: TC | Performed by: RADIOLOGY

## 2021-11-01 PROCEDURE — 86803 HEPATITIS C AB TEST: CPT | Performed by: FAMILY MEDICINE

## 2021-11-01 PROCEDURE — 87389 HIV-1 AG W/HIV-1&-2 AB AG IA: CPT | Performed by: FAMILY MEDICINE

## 2021-11-01 PROCEDURE — 80053 COMPREHEN METABOLIC PANEL: CPT | Performed by: FAMILY MEDICINE

## 2021-11-01 PROCEDURE — 36415 COLL VENOUS BLD VENIPUNCTURE: CPT | Performed by: FAMILY MEDICINE

## 2021-11-01 PROCEDURE — 99214 OFFICE O/P EST MOD 30 MIN: CPT | Performed by: FAMILY MEDICINE

## 2021-11-01 PROCEDURE — 80061 LIPID PANEL: CPT | Performed by: FAMILY MEDICINE

## 2021-11-01 RX ORDER — FLUTICASONE PROPIONATE 50 MCG
1 SPRAY, SUSPENSION (ML) NASAL DAILY
Qty: 18.2 ML | Refills: 4 | Status: SHIPPED | OUTPATIENT
Start: 2021-11-01 | End: 2023-07-21

## 2021-11-01 RX ORDER — CYCLOBENZAPRINE HCL 5 MG
TABLET ORAL
Qty: 30 TABLET | Refills: 3 | Status: SHIPPED | OUTPATIENT
Start: 2021-11-01 | End: 2023-01-10

## 2021-11-01 ASSESSMENT — ANXIETY QUESTIONNAIRES
7. FEELING AFRAID AS IF SOMETHING AWFUL MIGHT HAPPEN: NOT AT ALL
1. FEELING NERVOUS, ANXIOUS, OR ON EDGE: NOT AT ALL
3. WORRYING TOO MUCH ABOUT DIFFERENT THINGS: NOT AT ALL
IF YOU CHECKED OFF ANY PROBLEMS ON THIS QUESTIONNAIRE, HOW DIFFICULT HAVE THESE PROBLEMS MADE IT FOR YOU TO DO YOUR WORK, TAKE CARE OF THINGS AT HOME, OR GET ALONG WITH OTHER PEOPLE: NOT DIFFICULT AT ALL
6. BECOMING EASILY ANNOYED OR IRRITABLE: SEVERAL DAYS
GAD7 TOTAL SCORE: 1
2. NOT BEING ABLE TO STOP OR CONTROL WORRYING: NOT AT ALL
5. BEING SO RESTLESS THAT IT IS HARD TO SIT STILL: NOT AT ALL
4. TROUBLE RELAXING: NOT AT ALL

## 2021-11-01 ASSESSMENT — PATIENT HEALTH QUESTIONNAIRE - PHQ9: SUM OF ALL RESPONSES TO PHQ QUESTIONS 1-9: 1

## 2021-11-01 ASSESSMENT — PAIN SCALES - GENERAL: PAINLEVEL: NO PAIN (0)

## 2021-11-01 ASSESSMENT — MIFFLIN-ST. JEOR: SCORE: 1847.9

## 2021-11-01 NOTE — PROGRESS NOTES
"  Assessment & Plan     Migraine without aura and without status migrainosus, not intractable  Takes excedrin or goes to chiropractor and or goes to bed and HA resolves  Declines preventive medication as he is only getting HAs about 3x/mo  - cyclobenzaprine (FLEXERIL) 5 MG tablet; TAKE 1 TABLET BY MOUTH TWICE DAILY AS NEEDED FOR  MUSCLE  SPASMS    Hypertriglyceridemia  Non fasting labs  The 10-year ASCVD risk score (Paris BUSTILLO Jr., et al., 2013) is: 3.5%    Values used to calculate the score:      Age: 46 years      Sex: Male      Is Non- : No      Diabetic: No      Tobacco smoker: No      Systolic Blood Pressure: 132 mmHg      Is BP treated: No      HDL Cholesterol: 37 mg/dL      Total Cholesterol: 226 mg/dL d  - Comprehensive metabolic panel  - Lipid Profile    Need for hepatitis C screening test    - Hepatitis C Screen Reflex to HCV RNA Quant and Genotype    Screening for HIV (human immunodeficiency virus)    - HIV Antigen Antibody Combo    DDD (degenerative disc disease), cervical  refilled  - cyclobenzaprine (FLEXERIL) 5 MG tablet; TAKE 1 TABLET BY MOUTH TWICE DAILY AS NEEDED FOR  MUSCLE  SPASMS    Seasonal allergic rhinitis due to other allergic trigger  Uses occasionally  - fluticasone (FLONASE) 50 MCG/ACT nasal spray; Spray 1 spray into both nostrils daily    History of COVID-19    - XR CHEST 2 VW (Clinic Performed); Future    Elevated liver enzymes  Slight improvement from last year, advised cutting back on sugar         BMI:   Estimated body mass index is 28.77 kg/m  as calculated from the following:    Height as of this encounter: 1.803 m (5' 11\").    Weight as of this encounter: 93.6 kg (206 lb 4.8 oz).   Weight management plan: Discussed healthy diet and exercise guidelines    Patient was agreeable to this plan and had no further questions.  There are no Patient Instructions on file for this visit.    No follow-ups on file.    Jesica Clarke MD  [facility] - " "JOVANNI Nieto is a 46 year old who presents for the following health issues     HPI     Migraine     Since your last clinic visit, how have your headaches changed?  No change    How often are you getting headaches or migraines? Couple times a month     Are you able to do normal daily activities when you have a migraine? Yes    Are you taking rescue/relief medications? (Select all that apply) Excedrin    How helpful is your rescue/relief medication?  I get some relief    Are you taking any medications to prevent migraines? (Select all that apply)  No    In the past 4 weeks, how often have you gone to urgent care or the emergency room because of your headaches?  0      How many servings of fruits and vegetables do you eat daily?  2-3    On average, how many sweetened beverages do you drink each day (Examples: soda, juice, sweet tea, etc.  Do NOT count diet or artificially sweetened beverages)?   3    How many days per week do you exercise enough to make your heart beat faster? 7    How many minutes a day do you exercise enough to make your heart beat faster? 30 - 60    How many days per week do you miss taking your medication? 0    Hyperlipidemia Follow-Up      Are you regularly taking any medication or supplement to lower your cholesterol?   No    Are you having muscle aches or other side effects that you think could be caused by your cholesterol lowering medication?  No    Review of Systems   Constitutional, HEENT, cardiovascular, pulmonary, gi and gu systems are negative, except as otherwise noted.      Objective    /72 (BP Location: Right arm, Patient Position: Sitting, Cuff Size: Adult Regular)   Pulse 74   Temp 97.3  F (36.3  C) (Tympanic)   Resp 16   Ht 1.803 m (5' 11\")   Wt 93.6 kg (206 lb 4.8 oz)   SpO2 97%   BMI 28.77 kg/m    There is no height or weight on file to calculate BMI.  Physical Exam   GENERAL: healthy, alert and no distress  NECK: no adenopathy, no asymmetry, masses, " or scars and thyroid normal to palpation  RESP: lungs clear to auscultation - no rales, rhonchi or wheezes  CV: regular rate and rhythm, normal S1 S2, no S3 or S4, no murmur, click or rub, no peripheral edema and peripheral pulses strong  ABDOMEN: soft, nontender, no hepatosplenomegaly, no masses and bowel sounds normal  MS: no gross musculoskeletal defects noted, no edema  PSYCH: mentation appears normal, affect normal/bright    Results for orders placed or performed in visit on 11/01/21   XR CHEST 2 VW (Clinic Performed)     Status: None    Narrative    PROCEDURE: XR CHEST 2 VW 11/1/2021 11:45 AM    HISTORY: History of COVID-19    COMPARISONS: 4/30/2015.    TECHNIQUE: 2 views.    FINDINGS: Heart and pulmonary vasculature are normal. Lungs are clear  and no pleural effusion is seen.         Impression    IMPRESSION: No acute infiltrate.    RAMY FISHER MD         SYSTEM ID:  [id number]   Results for orders placed or performed in visit on 11/01/21   Comprehensive metabolic panel     Status: Abnormal   Result Value Ref Range    Sodium 138 133 - 144 mmol/L    Potassium 3.9 3.4 - 5.3 mmol/L    Chloride 108 94 - 109 mmol/L    Carbon Dioxide (CO2) 28 20 - 32 mmol/L    Anion Gap 2 (L) 3 - 14 mmol/L    Urea Nitrogen 16 7 - 30 mg/dL    Creatinine 0.82 0.66 - 1.25 mg/dL    Calcium 8.9 8.5 - 10.1 mg/dL    Glucose 99 70 - 99 mg/dL    Alkaline Phosphatase 97 40 - 150 U/L    AST 31 0 - 45 U/L    ALT 75 (H) 0 - 70 U/L    Protein Total 8.3 6.8 - 8.8 g/dL    Albumin 4.0 3.4 - 5.0 g/dL    Bilirubin Total 0.5 0.2 - 1.3 mg/dL    GFR Estimate >90 >60 mL/min/1.73m2   Lipid Profile     Status: Abnormal   Result Value Ref Range    Cholesterol 226 (H) <200 mg/dL    Triglycerides 226 (H) <150 mg/dL    Direct Measure HDL 37 (L) >=40 mg/dL    LDL Cholesterol Calculated 144 (H) <=100 mg/dL    Non HDL Cholesterol 189 (H) <130 mg/dL    Patient Fasting > 8hrs? No     Narrative    Cholesterol  Desirable:  <200 mg/dL    Triglycerides  Normal:   Less than 150 mg/dL  Borderline High:  150-199 mg/dL  High:  200-499 mg/dL  Very High:  Greater than or equal to 500 mg/dL    Direct Measure HDL  Female:  Greater than or equal to 50 mg/dL   Male:  Greater than or equal to 40 mg/dL    LDL Cholesterol  Desirable:  <100mg/dL  Above Desirable:  100-129 mg/dL   Borderline High:  130-159 mg/dL   High:  160-189 mg/dL   Very High:  >= 190 mg/dL    Non HDL Cholesterol  Desirable:  130 mg/dL  Above Desirable:  130-159 mg/dL  Borderline High:  160-189 mg/dL  High:  190-219 mg/dL  Very High:  Greater than or equal to 220 mg/dL

## 2021-11-01 NOTE — NURSING NOTE
"Chief Complaint   Patient presents with     Headache       Initial Ht 1.803 m (5' 11\")   Wt 93.6 kg (206 lb 4.8 oz)   BMI 28.77 kg/m   Estimated body mass index is 28.77 kg/m  as calculated from the following:    Height as of this encounter: 1.803 m (5' 11\").    Weight as of this encounter: 93.6 kg (206 lb 4.8 oz).  Medication Reconciliation: complete  Gloria Salmeron MA  "

## 2021-11-02 LAB
HCV AB SERPL QL IA: NONREACTIVE
HIV 1+2 AB+HIV1 P24 AG SERPL QL IA: NONREACTIVE

## 2021-11-02 ASSESSMENT — ANXIETY QUESTIONNAIRES: GAD7 TOTAL SCORE: 1

## 2021-11-03 ENCOUNTER — MYC MEDICAL ADVICE (OUTPATIENT)
Dept: FAMILY MEDICINE | Facility: OTHER | Age: 46
End: 2021-11-03

## 2022-11-11 ENCOUNTER — HOSPITAL ENCOUNTER (EMERGENCY)
Facility: HOSPITAL | Age: 47
Discharge: HOME | End: 2022-11-11
Attending: PHYSICIAN ASSISTANT | Admitting: PHYSICIAN ASSISTANT
Payer: OTHER MISCELLANEOUS

## 2022-11-11 VITALS
SYSTOLIC BLOOD PRESSURE: 159 MMHG | OXYGEN SATURATION: 96 % | RESPIRATION RATE: 16 BRPM | TEMPERATURE: 98.1 F | HEART RATE: 87 BPM | DIASTOLIC BLOOD PRESSURE: 98 MMHG

## 2022-11-11 DIAGNOSIS — L03.114 CELLULITIS OF LEFT FOREARM: ICD-10-CM

## 2022-11-11 DIAGNOSIS — T22.212A: ICD-10-CM
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
## 2022-11-11 PROCEDURE — 96372 THER/PROPH/DIAG INJ SC/IM: CPT | Performed by: PHYSICIAN ASSISTANT

## 2022-11-11 PROCEDURE — 99213 OFFICE O/P EST LOW 20 MIN: CPT | Performed by: PHYSICIAN ASSISTANT

## 2022-11-11 PROCEDURE — 250N000011 HC RX IP 250 OP 636: Performed by: PHYSICIAN ASSISTANT

## 2022-11-11 PROCEDURE — G0463 HOSPITAL OUTPT CLINIC VISIT: HCPCS | Mod: 25

## 2022-11-11 RX ORDER — SACCHAROMYCES BOULARDII 250 MG
250 CAPSULE ORAL 2 TIMES DAILY
Qty: 20 CAPSULE | Refills: 0 | Status: SHIPPED | OUTPATIENT
Start: 2022-11-11 | End: 2022-11-21

## 2022-11-11 RX ORDER — CEFTRIAXONE SODIUM 1 G
1 VIAL (EA) INJECTION ONCE
Status: COMPLETED | OUTPATIENT
Start: 2022-11-11 | End: 2022-11-11

## 2022-11-11 RX ORDER — SULFAMETHOXAZOLE/TRIMETHOPRIM 800-160 MG
1 TABLET ORAL 2 TIMES DAILY
Qty: 20 TABLET | Refills: 0 | Status: SHIPPED | OUTPATIENT
Start: 2022-11-11 | End: 2022-11-21

## 2022-11-11 RX ORDER — SILVER SULFADIAZINE 10 MG/G
CREAM TOPICAL
Qty: 400 G | Refills: 1 | Status: SHIPPED | OUTPATIENT
Start: 2022-11-11 | End: 2022-11-25

## 2022-11-11 RX ADMIN — CEFTRIAXONE SODIUM 1 G: 1 INJECTION, POWDER, FOR SOLUTION INTRAMUSCULAR; INTRAVENOUS at 19:02

## 2022-11-11 ASSESSMENT — ACTIVITIES OF DAILY LIVING (ADL): ADLS_ACUITY_SCORE: 33

## 2022-11-11 ASSESSMENT — ENCOUNTER SYMPTOMS
FEVER: 0
SEIZURES: 0
TREMORS: 0
FACIAL SWELLING: 0
STRIDOR: 0
COUGH: 0
EYE REDNESS: 0
FACIAL ASYMMETRY: 0
FLANK PAIN: 0
AGITATION: 0
CONFUSION: 0

## 2022-11-12 NOTE — ED TRIAGE NOTES
Pt presents with c/o a burn to left arm. Reports he accidentally touched his arm against refractory wall. Incident happened Sunday at work. C/o increased swelling and redness. Last tdap was in 2019. Pt has been putting neosporin on it and keeping covered.
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
## 2022-11-12 NOTE — ED TRIAGE NOTES
Pt reports to triage with burn on left arm.  Happened on Sunday at work  Arm touch refractory wall.  Pt didn't notice burn until he got home after work.  Increased swelling and redness noted.  pain 4/10.

## 2023-01-09 ENCOUNTER — MYC MEDICAL ADVICE (OUTPATIENT)
Dept: FAMILY MEDICINE | Facility: OTHER | Age: 48
End: 2023-01-09

## 2023-01-09 DIAGNOSIS — M50.30 DDD (DEGENERATIVE DISC DISEASE), CERVICAL: ICD-10-CM

## 2023-01-09 DIAGNOSIS — G43.009 MIGRAINE WITHOUT AURA AND WITHOUT STATUS MIGRAINOSUS, NOT INTRACTABLE: ICD-10-CM

## 2023-01-10 RX ORDER — CYCLOBENZAPRINE HCL 5 MG
TABLET ORAL
Qty: 30 TABLET | Refills: 1 | Status: SHIPPED | OUTPATIENT
Start: 2023-01-10 | End: 2023-07-21

## 2023-01-10 NOTE — TELEPHONE ENCOUNTER
Flexeril       Last Written Prescription Date:  11-1-21  Last Fill Quantity: 30,   # refills: 3  Last Office Visit: 11-1-21  Future Office visit:       Routing refill request to provider for review/approval because:

## 2023-01-17 NOTE — TELEPHONE ENCOUNTER
Attempt # 1  Outcome: Left Message   Comment: LM for patient to return call to clinic to schedule a medication review with Dr. Clarke before his next refill in 2 months

## 2023-03-08 NOTE — PROGRESS NOTES
Assessment & Plan     Hypertriglyceridemia  The 10-year ASCVD risk score (Sharmaine GARCIA, et al., 2019) is: 3.9%    Values used to calculate the score:      Age: 47 years      Sex: Male      Is Non- : No      Diabetic: No      Tobacco smoker: No      Systolic Blood Pressure: 128 mmHg      Is BP treated: No      HDL Cholesterol: 36 mg/dL      Total Cholesterol: 232 mg/dL  No need for meds  - Lipid Profile (Chol, Trig, HDL, LDL calc)  - Comprehensive metabolic panel    Elevated liver enzymes  Better than previous but encourage lifestyle changes and recheck in 2 months    Seasonal allergic rhinitis due to other allergic trigger  Has followed with ENT and things are stable.  Meds refilled.    Loud snoring  Followed with ENT and did not get any improvement with treating sinuses will send on to sleep clinic for possible sleep study.  - Adult Sleep Eval & Management  Referral; Future    Special screening for malignant neoplasms, colon  Due for Cologuard  - COLOGUARD([employer]); Future    Other fatigue  Labs pending, however could be related to sleep apnea or his snoring interrupting his sleep.  - TSH with free T4 reflex  - Vitamin B12    Hypovitaminosis D    - Vitamin D Deficiency           Patient was agreeable to this plan and had no further questions.  There are no Patient Instructions on file for this visit.    No follow-ups on file.    Jesica Clarke MD  [facility] - JOVANNI Nieto is a 47 year old, presenting for the following health issues:  Lipids and Headache      HPI     Hyperlipidemia Follow-Up    Are you regularly taking any medication or supplement to lower your cholesterol?   No    Are you having muscle aches or other side effects that you think could be caused by your cholesterol lowering medication?  No      Migraine     Since your last clinic visit, how have your headaches changed?  No change    How often are you getting headaches or  migraines? 1-2 a month     Are you able to do normal daily activities when you have a migraine? Yes    Are you taking rescue/relief medications? (Select all that apply) Excedrin and Other: flexeril    How helpful is your rescue/relief medication?  I get some relief    Are you taking any medications to prevent migraines? (Select all that apply)  No    In the past 4 weeks, how often have you gone to urgent care or the emergency room because of your headaches?  0      How many servings of fruits and vegetables do you eat daily?  2-3    On average, how many sweetened beverages do you drink each day (Examples: soda, juice, sweet tea, etc.  Do NOT count diet or artificially sweetened beverages)?   3    How many days per week do you exercise enough to make your heart beat faster? 7    How many minutes a day do you exercise enough to make your heart beat faster? 30 - 60    How many days per week do you miss taking your medication? 0    Heroic snoring -- other people comment    Review of Systems   Constitutional, HEENT, cardiovascular, pulmonary, gi and gu systems are negative, except as otherwise noted.      Objective    /82 (BP Location: Right arm, Patient Position: Sitting, Cuff Size: Adult Regular)   Pulse 75   Temp 96.8  F (36  C) (Tympanic)   Resp 18   Wt 93.8 kg (206 lb 12.8 oz)   SpO2 98%   BMI 28.84 kg/m    Body mass index is 28.84 kg/m .  Physical Exam   GENERAL: healthy, alert and no distress  NECK: no adenopathy, no asymmetry, masses, or scars and thyroid normal to palpation  RESP: lungs clear to auscultation - no rales, rhonchi or wheezes  CV: regular rate and rhythm, normal S1 S2, no S3 or S4, no murmur, click or rub, no peripheral edema and peripheral pulses strong  ABDOMEN: soft, nontender, no hepatosplenomegaly, no masses and bowel sounds normal  MS: no gross musculoskeletal defects noted, no edema  PSYCH: mentation appears normal, affect normal/bright    Results for orders placed or performed in  visit on 03/09/23   Lipid Profile (Chol, Trig, HDL, LDL calc)     Status: Abnormal   Result Value Ref Range    Cholesterol 232 (H) <200 mg/dL    Triglycerides 293 (H) <150 mg/dL    Direct Measure HDL 36 (L) >=40 mg/dL    LDL Cholesterol Calculated 137 (H) <=100 mg/dL    Non HDL Cholesterol 196 (H) <130 mg/dL    Narrative    Cholesterol  Desirable:  <200 mg/dL    Triglycerides  Normal:  Less than 150 mg/dL  Borderline High:  150-199 mg/dL  High:  200-499 mg/dL  Very High:  Greater than or equal to 500 mg/dL    Direct Measure HDL  Female:  Greater than or equal to 50 mg/dL   Male:  Greater than or equal to 40 mg/dL    LDL Cholesterol  Desirable:  <100mg/dL  Above Desirable:  100-129 mg/dL   Borderline High:  130-159 mg/dL   High:  160-189 mg/dL   Very High:  >= 190 mg/dL    Non HDL Cholesterol  Desirable:  130 mg/dL  Above Desirable:  130-159 mg/dL  Borderline High:  160-189 mg/dL  High:  190-219 mg/dL  Very High:  Greater than or equal to 220 mg/dL   Comprehensive metabolic panel     Status: Abnormal   Result Value Ref Range    Sodium 140 136 - 145 mmol/L    Potassium 4.4 3.4 - 5.3 mmol/L    Chloride 104 98 - 107 mmol/L    Carbon Dioxide (CO2) 24 22 - 29 mmol/L    Anion Gap 12 7 - 15 mmol/L    Urea Nitrogen 13.7 6.0 - 20.0 mg/dL    Creatinine 0.87 0.67 - 1.17 mg/dL    Calcium 9.4 8.6 - 10.0 mg/dL    Glucose 99 70 - 99 mg/dL    Alkaline Phosphatase 94 40 - 129 U/L    AST 40 10 - 50 U/L    ALT 65 (H) 10 - 50 U/L    Protein Total 7.5 6.4 - 8.3 g/dL    Albumin 4.4 3.5 - 5.2 g/dL    Bilirubin Total 0.3 <=1.2 mg/dL    GFR Estimate >90 >60 mL/min/1.73m2   TSH with free T4 reflex     Status: Normal   Result Value Ref Range    TSH 1.69 0.30 - 4.20 uIU/mL   Results for orders placed or performed in visit on 03/09/23   Extra Tube     Status: None    Narrative    The following orders were created for panel order Extra Tube.  Procedure                               Abnormality         Status                     ---------                                -----------         ------                     Extra Green Top (Lithium...[[id number]]                      Final result               Extra Purple Top Tube[[id number]]                            Final result                 Please view results for these tests on the individual orders.   Extra Green Top (Lithium Heparin) Tube     Status: None   Result Value Ref Range    Hold Specimen JIC    Extra Purple Top Tube     Status: None   Result Value Ref Range    Hold Specimen JIC

## 2023-03-09 ENCOUNTER — LAB (OUTPATIENT)
Dept: LAB | Facility: OTHER | Age: 48
End: 2023-03-09
Attending: FAMILY MEDICINE
Payer: COMMERCIAL

## 2023-03-09 ENCOUNTER — OFFICE VISIT (OUTPATIENT)
Dept: FAMILY MEDICINE | Facility: OTHER | Age: 48
End: 2023-03-09
Attending: FAMILY MEDICINE
Payer: COMMERCIAL

## 2023-03-09 VITALS
BODY MASS INDEX: 28.84 KG/M2 | RESPIRATION RATE: 18 BRPM | WEIGHT: 206.8 LBS | OXYGEN SATURATION: 98 % | HEART RATE: 75 BPM | DIASTOLIC BLOOD PRESSURE: 82 MMHG | SYSTOLIC BLOOD PRESSURE: 128 MMHG | TEMPERATURE: 96.8 F

## 2023-03-09 DIAGNOSIS — E78.1 HYPERTRIGLYCERIDEMIA: Primary | ICD-10-CM

## 2023-03-09 DIAGNOSIS — R74.8 ELEVATED LIVER ENZYMES: ICD-10-CM

## 2023-03-09 DIAGNOSIS — E55.9 HYPOVITAMINOSIS D: ICD-10-CM

## 2023-03-09 DIAGNOSIS — R53.83 OTHER FATIGUE: ICD-10-CM

## 2023-03-09 DIAGNOSIS — R06.83 LOUD SNORING: ICD-10-CM

## 2023-03-09 DIAGNOSIS — Z12.11 SPECIAL SCREENING FOR MALIGNANT NEOPLASMS, COLON: ICD-10-CM

## 2023-03-09 DIAGNOSIS — J30.89 SEASONAL ALLERGIC RHINITIS DUE TO OTHER ALLERGIC TRIGGER: ICD-10-CM

## 2023-03-09 LAB
ALBUMIN SERPL BCG-MCNC: 4.4 G/DL (ref 3.5–5.2)
ALP SERPL-CCNC: 94 U/L (ref 40–129)
ALT SERPL W P-5'-P-CCNC: 65 U/L (ref 10–50)
ANION GAP SERPL CALCULATED.3IONS-SCNC: 12 MMOL/L (ref 7–15)
AST SERPL W P-5'-P-CCNC: 40 U/L (ref 10–50)
BILIRUB SERPL-MCNC: 0.3 MG/DL
BUN SERPL-MCNC: 13.7 MG/DL (ref 6–20)
CALCIUM SERPL-MCNC: 9.4 MG/DL (ref 8.6–10)
CHLORIDE SERPL-SCNC: 104 MMOL/L (ref 98–107)
CHOLEST SERPL-MCNC: 232 MG/DL
CREAT SERPL-MCNC: 0.87 MG/DL (ref 0.67–1.17)
DEPRECATED HCO3 PLAS-SCNC: 24 MMOL/L (ref 22–29)
GFR SERPL CREATININE-BSD FRML MDRD: >90 ML/MIN/1.73M2
GLUCOSE SERPL-MCNC: 99 MG/DL (ref 70–99)
HDLC SERPL-MCNC: 36 MG/DL
HOLD SPECIMEN: NORMAL
HOLD SPECIMEN: NORMAL
LDLC SERPL CALC-MCNC: 137 MG/DL
NONHDLC SERPL-MCNC: 196 MG/DL
POTASSIUM SERPL-SCNC: 4.4 MMOL/L (ref 3.4–5.3)
PROT SERPL-MCNC: 7.5 G/DL (ref 6.4–8.3)
SODIUM SERPL-SCNC: 140 MMOL/L (ref 136–145)
TRIGL SERPL-MCNC: 293 MG/DL
TSH SERPL DL<=0.005 MIU/L-ACNC: 1.69 UIU/ML (ref 0.3–4.2)
VIT B12 SERPL-MCNC: 956 PG/ML (ref 232–1245)

## 2023-03-09 PROCEDURE — 82607 VITAMIN B-12: CPT | Performed by: FAMILY MEDICINE

## 2023-03-09 PROCEDURE — 36415 COLL VENOUS BLD VENIPUNCTURE: CPT

## 2023-03-09 PROCEDURE — 82306 VITAMIN D 25 HYDROXY: CPT | Performed by: FAMILY MEDICINE

## 2023-03-09 PROCEDURE — 99214 OFFICE O/P EST MOD 30 MIN: CPT | Performed by: FAMILY MEDICINE

## 2023-03-09 PROCEDURE — 80061 LIPID PANEL: CPT | Performed by: FAMILY MEDICINE

## 2023-03-09 PROCEDURE — 84443 ASSAY THYROID STIM HORMONE: CPT | Performed by: FAMILY MEDICINE

## 2023-03-09 PROCEDURE — 80053 COMPREHEN METABOLIC PANEL: CPT | Performed by: FAMILY MEDICINE

## 2023-03-09 SDOH — HEALTH STABILITY: PHYSICAL HEALTH: ON AVERAGE, HOW MANY DAYS PER WEEK DO YOU ENGAGE IN MODERATE TO STRENUOUS EXERCISE (LIKE A BRISK WALK)?: 5 DAYS

## 2023-03-09 SDOH — HEALTH STABILITY: PHYSICAL HEALTH: ON AVERAGE, HOW MANY MINUTES DO YOU ENGAGE IN EXERCISE AT THIS LEVEL?: 30 MIN

## 2023-03-09 ASSESSMENT — LIFESTYLE VARIABLES
HOW OFTEN DO YOU HAVE SIX OR MORE DRINKS ON ONE OCCASION: NEVER
SKIP TO QUESTIONS 9-10: 1
HOW MANY STANDARD DRINKS CONTAINING ALCOHOL DO YOU HAVE ON A TYPICAL DAY: 1 OR 2
AUDIT-C TOTAL SCORE: 2
HOW OFTEN DO YOU HAVE A DRINK CONTAINING ALCOHOL: 2-4 TIMES A MONTH

## 2023-03-09 ASSESSMENT — PAIN SCALES - GENERAL: PAINLEVEL: NO PAIN (0)

## 2023-03-09 ASSESSMENT — PATIENT HEALTH QUESTIONNAIRE - PHQ9: SUM OF ALL RESPONSES TO PHQ QUESTIONS 1-9: 0

## 2023-03-09 NOTE — COMMUNITY RESOURCES LIST (ENGLISH)
03/09/2023   [facility] - Outpatient Clinics  N/A  For questions about this resource list or additional care needs, please contact your primary care clinic or care manager.  Phone: [phone]   Email: N/A   Address: [address] [place] [id number]   Hours: N/A        Exercise and Recreation       Gym or workout facility  1  [facility] Distance: 11.7 miles      In-Person   [address] [place] [address]  Language: English  Hours: Mon - Fri 5:00 AM - 9:00 PM , Sat 7:00 AM - 7:00 PM , Sun 10:00 AM - 6:00 PM  Fees: Self Pay, Sliding Fee   Phone: [phone] Email: membership@emotion.me.org Website: https://emotion.me.org/          Important Numbers & Websites       Emergency Services   911  [facility]   311  Poison Control   (870) 252-6964  Suicide Prevention Lifeline   (882) 604-5199 (TALK)  Child Abuse Hotline   (108) 122-6122 (4-A-Child)  Sexual Assault Hotline   (215) 166-8717 (HOPE)  National Runaway Safeline   (455) 365-5870 (RUNAWAY)  All-Options Talkline   (461) 816-1728  Substance Abuse Referral   (303) 808-7223 (HELP)

## 2023-03-09 NOTE — RESULT ENCOUNTER NOTE
Please call patient with the following results:  Thyroid is normal,   Cholesterol is good but triglycerides are high, watch carbs, sugar, etoh, cut back and work on 5 lb weight loss.  Liver enzymes are high (slight better than last year) but again cut out sodas, high fructose corn syrup, sugars to help with this and will recheck  -- need lab only in 2 mos, please schedule

## 2023-03-12 LAB — DEPRECATED CALCIDIOL+CALCIFEROL SERPL-MC: 25 UG/L (ref 20–75)

## 2023-03-13 NOTE — RESULT ENCOUNTER NOTE
Please call patient with the following results:  Vitamin d is low, recommend [address]181 international unit(s) weekly OTC

## 2023-04-10 DIAGNOSIS — Z12.11 SPECIAL SCREENING FOR MALIGNANT NEOPLASMS, COLON: ICD-10-CM

## 2023-04-11 ENCOUNTER — DOCUMENTATION ONLY (OUTPATIENT)
Dept: SLEEP MEDICINE | Facility: HOSPITAL | Age: 48
End: 2023-04-11

## 2023-04-11 NOTE — PROGRESS NOTES
STOP BANG       Name: Gerson Powers MRN# [id number]   Age: 47 year old YOB: 1975     Stop Bang questionnaire completed with a score of >3 to allow for HST     Have you been told you snore loudly (louder than talking or loud enough to be heard through doors)? YES    Do you often feel tired, fatigued, or sleepy during the daytime? YES    Has anyone observed you stop breathing during your sleep? NO    Do you have or are you being treated for high blood pressure? NO    Is your BMI greater than 35? NO    Is your neck size circumference 16 inches or greater? NO    Are you over 50 years old? NO    Stop Bang Score (# of yes): 3
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
## 2023-04-12 NOTE — PROGRESS NOTES
"Chart review prior to sleep testing.    Patient Summary:  47 year old yo male who is referred for snoring.    Patient Active Problem List    Diagnosis Date Noted     Loud snoring 03/09/2023     Priority: Medium     Other fatigue 03/09/2023     Priority: Medium     Hypovitaminosis D 03/09/2023     Priority: Medium     Seasonal allergic rhinitis due to other allergic trigger 11/01/2021     Priority: Medium     History of COVID-19 11/01/2021     Priority: Medium     Encounter for  health examination 03/09/2020     Priority: Medium     Added automatically from request for surgery [id number]       Special screening for malignant neoplasms, colon 03/09/2020     Priority: Medium     Added automatically from request for surgery [id number]       Elevated liver enzymes 02/24/2020     Priority: Medium     Cellulitis of left upper extremity 02/06/2017     Priority: Medium     Hypertriglyceridemia 11/21/2016     Priority: Medium     Migraine without aura and without status migrainosus, not intractable 11/21/2016     Priority: Medium     DDD (degenerative disc disease), cervical 04/22/2015     Priority: Medium     Tobacco abuse 04/22/2015     Priority: Medium       Current Outpatient Medications   Medication     cyclobenzaprine (FLEXERIL) 5 MG tablet     fluticasone (FLONASE) 50 MCG/ACT nasal spray     No current facility-administered medications for this visit.       Pertinent PMHx of elevated liver enzymes, low vitamin D.    STOP-BANG score of 3, with unknown neck circumference.  [place] score of 15.  BMI of Estimated body mass index is 28.84 kg/m  as calculated from the following:    Height as of 11/1/21: 1.803 m (5' 11\").    Weight as of 3/9/23: 93.8 kg (206 lb 12.8 oz).     Per questionnaire: \"Snoring\"    Sxs for 10 years.    Caffeine use:  Yes for 3+ per day.  Yes for within 6 hours of bed.    Tobacco use: No    Sleep pattern:  Yes for rotating shifts.  4am/pm - 7am (?), total sleep time 4-9 hours.  Time to fall " asleep: ~2-10 minutes.  Awakenings: 0-1 times per night, 1 minutes to return to sleep, awake for total of 1 minutes per night.  Napping.  1 days per week, 20 minutes per nap.    Yes for RLS screen.  No for sleep walking.  No for dream enactment behavior.  Unknown for bruxism.    Yes for morning headaches.  Yes for snoring.  No for observed apnea.  No for FHx of TYRON.    SHx:  , lives with wife and 2 kids.  Works in [employer], shift work.    A/P:    1.)  High likelihood of TYRON with STOP-BANG score of 3.   - Would appear to be candidate for either home sleep testing or in-lab PSG.    ---  This note was written with the assistance of the Dragon voice-dictation technology software. The final document, although reviewed, may contain errors. For corrections, please contact the office.    Arnol Alex MD    Sleep Medicine    [place], [place]  o Main Office: [phone]    [facility] [place], [place]  o [address], [address]  o Schedule visits: [phone]  o Main Office: [phone]  o Fax: [phone]

## 2023-04-14 ENCOUNTER — TELEPHONE (OUTPATIENT)
Dept: PULMONOLOGY | Facility: OTHER | Age: 48
End: 2023-04-14

## 2023-04-21 DIAGNOSIS — R06.83 SNORING: Primary | ICD-10-CM

## 2023-04-23 ENCOUNTER — HEALTH MAINTENANCE LETTER (OUTPATIENT)
Age: 48
End: 2023-04-23

## 2023-04-26 LAB — NONINV COLON CA DNA+OCC BLD SCRN STL QL: NEGATIVE

## 2023-05-03 ENCOUNTER — VIRTUAL VISIT (OUTPATIENT)
Dept: SLEEP MEDICINE | Facility: HOSPITAL | Age: 48
End: 2023-05-03
Attending: FAMILY MEDICINE
Payer: COMMERCIAL

## 2023-05-03 DIAGNOSIS — R06.83 SNORING: ICD-10-CM

## 2023-05-03 PROCEDURE — 95800 SLP STDY UNATTENDED: CPT

## 2023-05-03 PROCEDURE — 95800 SLP STDY UNATTENDED: CPT | Mod: 26 | Performed by: FAMILY MEDICINE

## 2023-05-04 NOTE — PROGRESS NOTES
Patient was provided both verbal and written education and instructions on use of Watch PAT device. Watch PAT device has been registered and shipped via [employer] on 5/4/2023. Patient was notified that package was mailed out. Watch [employer] serial number: [id number].    Tracking # [id number]

## 2023-05-09 NOTE — PROCEDURES
"WatchPAT - HOME SLEEP STUDY INTERPRETATION    Patient: Gerson Powers  MRN: [id number]  YOB: 1975  Study Date: 5/8/2023  Referring Provider: Jesica Clarke  Ordering Provider: Arnol Alex MD, MD    Chain of custody patient verification was not enabled.       Indications for Home Study: Gerson Powers is a 47 year old male with a history of elevated liver enzymes, low vitamin D who presents with symptoms suggestive of obstructive sleep apnea.    Estimated body mass index is 28.84 kg/m  as calculated from the following:    Height as of 11/1/21: 1.803 m (5' 11\").    Weight as of 3/9/23: 93.8 kg (206 lb 12.8 oz).  [place] Sleepiness Scale: 15/24  STOP-BANG: 3/8    Data: A full night home sleep study was performed recording the standard physiologic parameters including peripheral arterial tonometry (PAT), sound/snoring, body position,  movement, sound, and oxygen saturation by pulse oximetry. Pulse rate was estimated by oximetry recording. Sleep staging (wake, REM, light, and deep sleep) was derived from PAT signal.  This study was considered adequate based on > 4 hours of quality oximetry and respiratory recording. As specified by the AASM Manual for the Scoring of Sleep and Associated events, version 2.3, Rule VIII.D 1B, 4% oxygen desaturation scoring for hypopneas is used as a standard of care on all home sleep apnea testing.    Total Recording Time: 8 hrs, 1 min  Total Sleep Time: 7 hrs, 20 min  % of Sleep Time REM: 23.1%    Respiratory:  Snoring: Snoring was present.  Respiratory events: The PAT respiratory disturbance index [pRDI] was 7.6 events per hour.  The PAT apnea/hypopnea index [pAHI] was 3.2 events per hour.  MARTHA was 2.6 events per hour.  During REM sleep the pAHI was 4.1.  Sleep Associated Hypoxemia: sustained hypoxemia was not present. Mean oxygen saturation was 94%.  Minimum was 87%.  Time with saturation less than 88% was 0.2 minutes.    Heart Rate: By pulse oximetry " normal rate was noted.     Position: Percent of time spent: supine - 43%, prone - 11.2%, on right - 35.7%, on left - 10.1%.  pAHI was 5.2 per hour supine, 1.2 per hour prone, 2.3 per hour on right side, and 0 per hour on left side.     Assessment:   - No significant sleep-disordered breathing.  - Sleep associated hypoxemia was not present.    Recommendations:  Consider in-lab PSG if high pre-test clinical concern of sleep-disordered breathing given potential for non-diagnostic home sleep testing.  Suggest optimizing sleep hygiene and avoiding sleep deprivation.  Weight management.    Diagnosis Code(s): Snoring R06.83    Arnol Alex MD, MD, May 9, 2023   Diplomate, American Board of Family Medicine, Sleep Medicine

## 2023-05-12 ENCOUNTER — DOCUMENTATION ONLY (OUTPATIENT)
Dept: SLEEP MEDICINE | Facility: HOSPITAL | Age: 48
End: 2023-05-12
Attending: FAMILY MEDICINE
Payer: COMMERCIAL

## 2023-05-12 PROCEDURE — 95800 SLP STDY UNATTENDED: CPT

## 2023-05-12 NOTE — PROGRESS NOTES
WatchPAT data has been received and has been scored using rule 1B, 4%. Patient to follow up with provider to determine appropriate therapy.    Pat AHI: 3.2    Ordering Provider: Dr Arnol Alex      Sleep Technician/Technologist: Janneth TELLO

## 2023-05-30 ENCOUNTER — VIRTUAL VISIT (OUTPATIENT)
Dept: PULMONOLOGY | Facility: OTHER | Age: 48
End: 2023-05-30
Attending: FAMILY MEDICINE
Payer: COMMERCIAL

## 2023-05-30 VITALS — BODY MASS INDEX: 28.7 KG/M2 | HEIGHT: 71 IN | WEIGHT: 205 LBS

## 2023-05-30 DIAGNOSIS — R06.83 SNORING: Primary | ICD-10-CM

## 2023-05-30 PROCEDURE — 99213 OFFICE O/P EST LOW 20 MIN: CPT | Mod: 95 | Performed by: FAMILY MEDICINE

## 2023-05-30 ASSESSMENT — PAIN SCALES - GENERAL: PAINLEVEL: NO PAIN (0)

## 2023-05-30 NOTE — PROGRESS NOTES
Virtual Visit Details    Type of service:  Telephone Visit   Phone call duration: 20 minutes   Gerson Powers is a 47 year old male who is being evaluated via a billable telephone visit.       What phone number would you like to be contacted at?@ [phone]  Home Phone [phone]   Work Phone Not on file.   Mobile [phone]   Home Phone [phone]       How would you like to obtain your AVS? MyChart       Telephone Virtual Visit Details     Type of service:  Telephone Virtual Visit     Originating Location (pt. Location): Home     Distant Location (provider location):  Off-site, [facility] [place]      Start Time: 2:30pm  End Time: 2:45pm    Virtual visit for WatchPAT home sleep testing..     A/P:     1.)  No significant TYRON (pAHI 3.2) without sleep-associated hypoxemia on WatchPAT HST  - Discussed that this result would likely rule out severe TYRON, but potential that mild to moderate TYRON could be under-reported on home sleep testing.  -His primary concern was socially disruptive snoring, so he did not feel that further testing was needed at this time.  - We discussed treatment options for primary snoring including oral appliances (dentist made, self fit), oral montelukast or nasal fluticasone (he currently takes this seasonally), upper airway surgery, weight management.  - We agreed to start with a combination of using Flonase at bedtime on a consistent basis and looking at a self fit mouthguard ice, I will send some options to consider.  - Plan for follow-up as needed.      2.)  Inadequate total sleep time  - Most likely due to inadequate time allowed for sleep given his busy schedule with shift work and also as a .  - Discussed goal of regular sleep time of 7 or more hours.    SUBJECTIVE:  Gerson Powers is a 47 year old male.    47 year old yo male who is referred for snoring.    Pertinent PMHx of elevated liver enzymes, low vitamin D.     STOP-BANG score of  "3, with unknown neck circumference.  [place] score of 15.  BMI of Estimated body mass index is 28.84 kg/m  as calculated from the following:    Height as of 11/1/21: 1.803 m (5' 11\").    Weight as of 3/9/23: 93.8 kg (206 lb 12.8 oz).      Today -we reviewed his sleep study results in detail.    Per questionnaire: \"Snoring\"     Sxs for 10 years.     Caffeine use:  Yes for 3+ per day.  Yes for within 6 hours of bed.     Tobacco use: No     Sleep pattern:  Yes for rotating shifts.  4am/pm - 7am (?), total sleep time 4-9 hours.  Time to fall asleep: ~2-10 minutes.  Awakenings: 0-1 times per night, 1 minutes to return to sleep, awake for total of 1 minutes per night.  Napping.  1 days per week, 20 minutes per nap.     Yes for RLS screen.  No for sleep walking.  No for dream enactment behavior.  Unknown for bruxism.     Yes for morning headaches.  Yes for snoring.  No for observed apnea.  No for FHx of TYRON.     SHx:  , lives with wife and 2 kids.  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    WatchPAT - HOME SLEEP STUDY INTERPRETATION     Patient: Gerson Powers  MRN: [id number]  YOB: 1975  Study Date: 5/8/2023  Referring Provider: Jesica Clarke  Ordering Provider: Arnol Alex MD, MD     Chain of custody patient verification was not enabled.       Indications for Home Study: Gerson Powers is a 47 year old male with a history of elevated liver enzymes, low vitamin D who presents with symptoms suggestive of obstructive sleep apnea.     Estimated body mass index is 28.84 kg/m  as calculated from the following:    Height as of 11/1/21: 1.803 m (5' 11\").    Weight as of 3/9/23: 93.8 kg (206 lb 12.8 oz).  [place] Sleepiness Scale: 15/24  STOP-BANG: 3/8     Data: A full night home sleep study was performed recording the standard physiologic parameters including peripheral arterial tonometry (PAT), sound/snoring, body position,  movement, sound, and oxygen saturation by pulse oximetry. Pulse rate was " estimated by oximetry recording. Sleep staging (wake, REM, light, and deep sleep) was derived from PAT signal.  This study was considered adequate based on > 4 hours of quality oximetry and respiratory recording. As specified by the AASM Manual for the Scoring of Sleep and Associated events, version 2.3, Rule VIII.D 1B, 4% oxygen desaturation scoring for hypopneas is used as a standard of care on all home sleep apnea testing.     Total Recording Time: 8 hrs, 1 min  Total Sleep Time: 7 hrs, 20 min  % of Sleep Time REM: 23.1%     Respiratory:  Snoring: Snoring was present.  Respiratory events: The PAT respiratory disturbance index [pRDI] was 7.6 events per hour.  The PAT apnea/hypopnea index [pAHI] was 3.2 events per hour.  MARTHA was 2.6 events per hour.  During REM sleep the pAHI was 4.1.  Sleep Associated Hypoxemia: sustained hypoxemia was not present. Mean oxygen saturation was 94%.  Minimum was 87%.  Time with saturation less than 88% was 0.2 minutes.     Heart Rate: By pulse oximetry normal rate was noted.      Position: Percent of time spent: supine - 43%, prone - 11.2%, on right - 35.7%, on left - 10.1%.  pAHI was 5.2 per hour supine, 1.2 per hour prone, 2.3 per hour on right side, and 0 per hour on left side.      Assessment:   - No significant sleep-disordered breathing.  - Sleep associated hypoxemia was not present.     Recommendations:  Consider in-lab PSG if high pre-test clinical concern of sleep-disordered breathing given potential for non-diagnostic home sleep testing.  Suggest optimizing sleep hygiene and avoiding sleep deprivation.  Weight management.     Diagnosis Code(s): Snoring R06.83     Arnol Alex MD, MD, May 9, 2023   Diplomate, American Board of Family Medicine, Sleep Medicine    Past medical history:    Patient Active Problem List    Diagnosis Date Noted     Loud snoring 03/09/2023     Priority: Medium     Other fatigue 03/09/2023     Priority: Medium     Hypovitaminosis D  03/09/2023     Priority: Medium     Seasonal allergic rhinitis due to other allergic trigger 11/01/2021     Priority: Medium     History of COVID-19 11/01/2021     Priority: Medium     Encounter for  health examination 03/09/2020     Priority: Medium     Added automatically from request for surgery [id number]       Special screening for malignant neoplasms, colon 03/09/2020     Priority: Medium     Added automatically from request for surgery [id number]       Elevated liver enzymes 02/24/2020     Priority: Medium     Cellulitis of left upper extremity 02/06/2017     Priority: Medium     Hypertriglyceridemia 11/21/2016     Priority: Medium     Migraine without aura and without status migrainosus, not intractable 11/21/2016     Priority: Medium     DDD (degenerative disc disease), cervical 04/22/2015     Priority: Medium     Tobacco abuse 04/22/2015     Priority: Medium       10 point ROS of systems including Constitutional, Eyes, Respiratory, Cardiovascular, Gastroenterology, Genitourinary, Integumentary, Muscularskeletal, Psychiatric were all negative except for pertinent positives noted in my HPI.    Current Outpatient Medications   Medication Sig Dispense Refill     cyclobenzaprine (FLEXERIL) 5 MG tablet TAKE 1 TABLET BY MOUTH TWICE DAILY AS NEEDED FOR  MUSCLE  SPASMS 30 tablet 1     fluticasone (FLONASE) 50 MCG/ACT nasal spray Spray 1 spray into both nostrils daily 18.2 mL 4       OBJECTIVE:  There were no vitals taken for this visit.    Physical Exam:  healthy, alert and no distress  PSYCH: Alert and oriented times 3; coherent speech, normal   rate and volume, able to articulate logical thoughts, able   to abstract reason, no tangential thoughts, no hallucinations   or delusions  His affect is normal  RESP: No cough, no audible wheezing, able to talk in full sentences  Remainder of exam unable to be completed due to telephone visits    ---  This note was written with the assistance of the Dragon  voice-dictation technology software. The final document, although reviewed, may contain errors. For corrections, please contact the office.    Total time spent preparing to see the patient, review of chart, obtaining history and physical examination, review of sleep testing, review of treatment options, education, discussion with patient and documenting in Epic / EMR was 20 minutes.  All time involved was spent on the day of service for the patient (the same day as the patient's appointment).    Arnol Alex MD    Sleep Medicine    [place], [place]  o Main Office: [phone]    [facility] [place], [place]  o [address], [address]  o Schedule visits: [phone]  o Main Office: [phone]  o Fax: [phone]

## 2023-05-30 NOTE — NURSING NOTE
Has patient had flu shot for current/most recent flu season? If so, when? No    Is the patient currently in the state of [place]? YES    Visit mode:TELEPHONE    If the visit is dropped, the patient can be reconnected by: TELEPHONE VISIT: Phone number: [phone]    Will anyone else be joining the visit? NO      How would you like to obtain your AVS? MyChart    Are changes needed to the allergy or medication list? NO    Reason for visit: RECHECK      Krissy Pimentel

## 2023-07-19 DIAGNOSIS — M50.30 DDD (DEGENERATIVE DISC DISEASE), CERVICAL: ICD-10-CM

## 2023-07-19 DIAGNOSIS — J30.89 SEASONAL ALLERGIC RHINITIS DUE TO OTHER ALLERGIC TRIGGER: ICD-10-CM

## 2023-07-19 DIAGNOSIS — G43.009 MIGRAINE WITHOUT AURA AND WITHOUT STATUS MIGRAINOSUS, NOT INTRACTABLE: ICD-10-CM

## 2023-07-20 NOTE — TELEPHONE ENCOUNTER
Flonase     Last Written Prescription Date:  11/1/2021  Last Fill Quantity: 18.2,   # refills: 4  Last Office Visit: 03/09/2023  Future Office visit:       Flexeril  Last Written Prescription Date:  1/10/2023  Last Fill Quantity: 30,   # refills: 1

## 2023-07-21 RX ORDER — FLUTICASONE PROPIONATE 50 MCG
SPRAY, SUSPENSION (ML) NASAL
Qty: 16 G | Refills: 0 | Status: SHIPPED | OUTPATIENT
Start: 2023-07-21 | End: 2024-04-23

## 2023-07-21 RX ORDER — CYCLOBENZAPRINE HCL 5 MG
TABLET ORAL
Qty: 30 TABLET | Refills: 0 | Status: SHIPPED | OUTPATIENT
Start: 2023-07-21 | End: 2024-04-23

## 2023-10-01 ENCOUNTER — HOSPITAL ENCOUNTER (EMERGENCY)
Facility: HOSPITAL | Age: 48
Discharge: HOME | End: 2023-10-01
Attending: STUDENT IN AN ORGANIZED HEALTH CARE EDUCATION/TRAINING PROGRAM | Admitting: STUDENT IN AN ORGANIZED HEALTH CARE EDUCATION/TRAINING PROGRAM
Payer: COMMERCIAL

## 2023-10-01 ENCOUNTER — APPOINTMENT (OUTPATIENT)
Dept: CT IMAGING | Facility: HOSPITAL | Age: 48
End: 2023-10-01
Attending: STUDENT IN AN ORGANIZED HEALTH CARE EDUCATION/TRAINING PROGRAM
Payer: COMMERCIAL

## 2023-10-01 VITALS
SYSTOLIC BLOOD PRESSURE: 164 MMHG | HEART RATE: 69 BPM | DIASTOLIC BLOOD PRESSURE: 106 MMHG | OXYGEN SATURATION: 99 % | RESPIRATION RATE: 18 BRPM | TEMPERATURE: 97 F

## 2023-10-01 DIAGNOSIS — N20.1 URETEROLITHIASIS: ICD-10-CM

## 2023-10-01 LAB
ALBUMIN UR-MCNC: 30 MG/DL
ANION GAP SERPL CALCULATED.3IONS-SCNC: 10 MMOL/L (ref 7–15)
APPEARANCE UR: ABNORMAL
BACTERIA #/AREA URNS HPF: ABNORMAL /HPF
BILIRUB UR QL STRIP: NEGATIVE
BUN SERPL-MCNC: 14.1 MG/DL (ref 6–20)
CALCIUM SERPL-MCNC: 9.8 MG/DL (ref 8.6–10)
CHLORIDE SERPL-SCNC: 104 MMOL/L (ref 98–107)
COLOR UR AUTO: YELLOW
CREAT SERPL-MCNC: 0.94 MG/DL (ref 0.67–1.17)
DEPRECATED HCO3 PLAS-SCNC: 26 MMOL/L (ref 22–29)
EGFRCR SERPLBLD CKD-EPI 2021: >90 ML/MIN/1.73M2
GLUCOSE SERPL-MCNC: 117 MG/DL (ref 70–99)
GLUCOSE UR STRIP-MCNC: NEGATIVE MG/DL
HGB UR QL STRIP: ABNORMAL
KETONES UR STRIP-MCNC: NEGATIVE MG/DL
LEUKOCYTE ESTERASE UR QL STRIP: NEGATIVE
MUCOUS THREADS #/AREA URNS LPF: PRESENT /LPF
NITRATE UR QL: NEGATIVE
PH UR STRIP: 5.5 [PH] (ref 4.7–8)
POTASSIUM SERPL-SCNC: 4.2 MMOL/L (ref 3.4–5.3)
RBC URINE: >182 /HPF
SODIUM SERPL-SCNC: 140 MMOL/L (ref 135–145)
SP GR UR STRIP: 1.02 (ref 1–1.03)
SQUAMOUS EPITHELIAL: 0 /HPF
UROBILINOGEN UR STRIP-MCNC: NORMAL MG/DL
WBC URINE: 5 /HPF

## 2023-10-01 PROCEDURE — 96374 THER/PROPH/DIAG INJ IV PUSH: CPT

## 2023-10-01 PROCEDURE — 99284 EMERGENCY DEPT VISIT MOD MDM: CPT | Performed by: STUDENT IN AN ORGANIZED HEALTH CARE EDUCATION/TRAINING PROGRAM

## 2023-10-01 PROCEDURE — 96375 TX/PRO/DX INJ NEW DRUG ADDON: CPT

## 2023-10-01 PROCEDURE — 36415 COLL VENOUS BLD VENIPUNCTURE: CPT | Performed by: STUDENT IN AN ORGANIZED HEALTH CARE EDUCATION/TRAINING PROGRAM

## 2023-10-01 PROCEDURE — 258N000003 HC RX IP 258 OP 636: Performed by: STUDENT IN AN ORGANIZED HEALTH CARE EDUCATION/TRAINING PROGRAM

## 2023-10-01 PROCEDURE — 82310 ASSAY OF CALCIUM: CPT | Performed by: STUDENT IN AN ORGANIZED HEALTH CARE EDUCATION/TRAINING PROGRAM

## 2023-10-01 PROCEDURE — 250N000011 HC RX IP 250 OP 636: Mod: JZ | Performed by: STUDENT IN AN ORGANIZED HEALTH CARE EDUCATION/TRAINING PROGRAM

## 2023-10-01 PROCEDURE — 74176 CT ABD & PELVIS W/O CONTRAST: CPT

## 2023-10-01 PROCEDURE — 99285 EMERGENCY DEPT VISIT HI MDM: CPT | Mod: 25

## 2023-10-01 PROCEDURE — 81001 URINALYSIS AUTO W/SCOPE: CPT | Performed by: STUDENT IN AN ORGANIZED HEALTH CARE EDUCATION/TRAINING PROGRAM

## 2023-10-01 RX ORDER — KETOROLAC TROMETHAMINE 15 MG/ML
15 INJECTION, SOLUTION INTRAMUSCULAR; INTRAVENOUS ONCE
Status: COMPLETED | OUTPATIENT
Start: 2023-10-01 | End: 2023-10-01

## 2023-10-01 RX ORDER — ONDANSETRON 2 MG/ML
4 INJECTION INTRAMUSCULAR; INTRAVENOUS EVERY 30 MIN PRN
Status: DISCONTINUED | OUTPATIENT
Start: 2023-10-01 | End: 2023-10-01 | Stop reason: HOSPADM

## 2023-10-01 RX ADMIN — KETOROLAC TROMETHAMINE 15 MG: 15 INJECTION, SOLUTION INTRAMUSCULAR; INTRAVENOUS at 13:26

## 2023-10-01 RX ADMIN — SODIUM CHLORIDE 1000 ML: 9 INJECTION, SOLUTION INTRAVENOUS at 13:25

## 2023-10-01 RX ADMIN — ONDANSETRON 4 MG: 2 INJECTION INTRAMUSCULAR; INTRAVENOUS at 13:25

## 2023-10-01 NOTE — ED TRIAGE NOTES
"C/o left flank pain rated 9/10 at times. States pain started yesterday. Denies history of kidney stones. States now today it feels like he can't \"get it all out\" when he urinates. Denies fevers.      Triage Assessment       Row Name 10/01/23 1302       Triage Assessment (Adult)    Airway WDL WDL       Respiratory WDL    Respiratory WDL WDL                    "
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
## 2023-10-01 NOTE — DISCHARGE INSTRUCTIONS
Return to the emergency department for worsening symptoms or new concerning symptoms.  Follow-up with a urologist, ideally within the next week.     [facility] #[phone]    Strain your urine.  Use ibuprofen and Tylenol for pain control.  May take Ibuprofen 600mg every 6 hours (not to exceed 3,200mg in 24 hours) and/or Tylenol 1000mg every 6 hours (not to exceed 4,000mg in 24 hours) as needed for pain     May alternate each medication every 3 hours. For example, if you took ibuprofen at 9am you could take tylenol at 12pm (noon), then repeat ibuprofen at 3pm and then repeat tylenol at 6pm and so on.

## 2024-04-18 DIAGNOSIS — J30.89 SEASONAL ALLERGIC RHINITIS DUE TO OTHER ALLERGIC TRIGGER: ICD-10-CM

## 2024-04-18 DIAGNOSIS — M50.30 DDD (DEGENERATIVE DISC DISEASE), CERVICAL: ICD-10-CM

## 2024-04-18 DIAGNOSIS — G43.009 MIGRAINE WITHOUT AURA AND WITHOUT STATUS MIGRAINOSUS, NOT INTRACTABLE: ICD-10-CM

## 2024-04-18 NOTE — TELEPHONE ENCOUNTER
Flonase      Last Written Prescription Date:  7/21/23  Last Fill Quantity: 16g,   # refills: 0  Last Office Visit: 3/9/23  Future Office visit:       Routing refill request to provider for review/approval because:      Flexeril      Last Written Prescription Date:  7/21/23  Last Fill Quantity: 30,   # refills: 0  Last Office Visit: 3/9/23  Future Office visit:       Routing refill request to provider for review/approval because:

## 2024-04-23 RX ORDER — CYCLOBENZAPRINE HCL 5 MG
TABLET ORAL
Qty: 30 TABLET | Refills: 0 | Status: SHIPPED | OUTPATIENT
Start: 2024-04-23 | End: 2024-06-28

## 2024-04-23 RX ORDER — FLUTICASONE PROPIONATE 50 MCG
SPRAY, SUSPENSION (ML) NASAL
Qty: 16 G | Refills: 0 | Status: SHIPPED | OUTPATIENT
Start: 2024-04-23 | End: 2024-06-28

## 2024-06-27 SDOH — HEALTH STABILITY: PHYSICAL HEALTH: ON AVERAGE, HOW MANY DAYS PER WEEK DO YOU ENGAGE IN MODERATE TO STRENUOUS EXERCISE (LIKE A BRISK WALK)?: 5 DAYS

## 2024-06-27 SDOH — HEALTH STABILITY: PHYSICAL HEALTH: ON AVERAGE, HOW MANY MINUTES DO YOU ENGAGE IN EXERCISE AT THIS LEVEL?: 60 MIN

## 2024-06-27 ASSESSMENT — PATIENT HEALTH QUESTIONNAIRE - PHQ9
10. IF YOU CHECKED OFF ANY PROBLEMS, HOW DIFFICULT HAVE THESE PROBLEMS MADE IT FOR YOU TO DO YOUR WORK, TAKE CARE OF THINGS AT HOME, OR GET ALONG WITH OTHER PEOPLE: NOT DIFFICULT AT ALL
SUM OF ALL RESPONSES TO PHQ QUESTIONS 1-9: 0
SUM OF ALL RESPONSES TO PHQ QUESTIONS 1-9: 0

## 2024-06-27 ASSESSMENT — SOCIAL DETERMINANTS OF HEALTH (SDOH): HOW OFTEN DO YOU GET TOGETHER WITH FRIENDS OR RELATIVES?: ONCE A WEEK

## 2024-06-27 ASSESSMENT — ANXIETY QUESTIONNAIRES
7. FEELING AFRAID AS IF SOMETHING AWFUL MIGHT HAPPEN: NOT AT ALL
2. NOT BEING ABLE TO STOP OR CONTROL WORRYING: NOT AT ALL
6. BECOMING EASILY ANNOYED OR IRRITABLE: NOT AT ALL
7. FEELING AFRAID AS IF SOMETHING AWFUL MIGHT HAPPEN: NOT AT ALL
1. FEELING NERVOUS, ANXIOUS, OR ON EDGE: NOT AT ALL
8. IF YOU CHECKED OFF ANY PROBLEMS, HOW DIFFICULT HAVE THESE MADE IT FOR YOU TO DO YOUR WORK, TAKE CARE OF THINGS AT HOME, OR GET ALONG WITH OTHER PEOPLE?: NOT DIFFICULT AT ALL
4. TROUBLE RELAXING: NOT AT ALL
GAD7 TOTAL SCORE: 0
3. WORRYING TOO MUCH ABOUT DIFFERENT THINGS: NOT AT ALL
GAD7 TOTAL SCORE: 0
GAD7 TOTAL SCORE: 0
IF YOU CHECKED OFF ANY PROBLEMS ON THIS QUESTIONNAIRE, HOW DIFFICULT HAVE THESE PROBLEMS MADE IT FOR YOU TO DO YOUR WORK, TAKE CARE OF THINGS AT HOME, OR GET ALONG WITH OTHER PEOPLE: NOT DIFFICULT AT ALL
5. BEING SO RESTLESS THAT IT IS HARD TO SIT STILL: NOT AT ALL

## 2024-06-28 ENCOUNTER — LAB (OUTPATIENT)
Dept: LAB | Facility: OTHER | Age: 49
End: 2024-06-28
Attending: FAMILY MEDICINE
Payer: COMMERCIAL

## 2024-06-28 ENCOUNTER — OFFICE VISIT (OUTPATIENT)
Dept: FAMILY MEDICINE | Facility: OTHER | Age: 49
End: 2024-06-28
Attending: FAMILY MEDICINE
Payer: COMMERCIAL

## 2024-06-28 VITALS
SYSTOLIC BLOOD PRESSURE: 138 MMHG | OXYGEN SATURATION: 97 % | TEMPERATURE: 97.9 F | WEIGHT: 206.31 LBS | HEART RATE: 77 BPM | HEIGHT: 71 IN | DIASTOLIC BLOOD PRESSURE: 98 MMHG | RESPIRATION RATE: 16 BRPM | BODY MASS INDEX: 28.88 KG/M2

## 2024-06-28 DIAGNOSIS — E55.9 HYPOVITAMINOSIS D: ICD-10-CM

## 2024-06-28 DIAGNOSIS — E78.1 HYPERTRIGLYCERIDEMIA: ICD-10-CM

## 2024-06-28 DIAGNOSIS — I10 ESSENTIAL HYPERTENSION, BENIGN: ICD-10-CM

## 2024-06-28 DIAGNOSIS — G43.009 MIGRAINE WITHOUT AURA AND WITHOUT STATUS MIGRAINOSUS, NOT INTRACTABLE: ICD-10-CM

## 2024-06-28 DIAGNOSIS — R74.8 ELEVATED LIVER ENZYMES: ICD-10-CM

## 2024-06-28 DIAGNOSIS — Z00.00 ROUTINE GENERAL MEDICAL EXAMINATION AT A HEALTH CARE FACILITY: Primary | ICD-10-CM

## 2024-06-28 DIAGNOSIS — J30.89 SEASONAL ALLERGIC RHINITIS DUE TO OTHER ALLERGIC TRIGGER: ICD-10-CM

## 2024-06-28 DIAGNOSIS — M50.30 DDD (DEGENERATIVE DISC DISEASE), CERVICAL: ICD-10-CM

## 2024-06-28 LAB
ALBUMIN SERPL BCG-MCNC: 4.4 G/DL (ref 3.5–5.2)
ALP SERPL-CCNC: 88 U/L (ref 40–150)
ALT SERPL W P-5'-P-CCNC: 58 U/L (ref 0–70)
ANION GAP SERPL CALCULATED.3IONS-SCNC: 10 MMOL/L (ref 7–15)
AST SERPL W P-5'-P-CCNC: 32 U/L (ref 0–45)
BILIRUB SERPL-MCNC: 0.2 MG/DL
BUN SERPL-MCNC: 13.7 MG/DL (ref 6–20)
CALCIUM SERPL-MCNC: 9.6 MG/DL (ref 8.6–10)
CHLORIDE SERPL-SCNC: 107 MMOL/L (ref 98–107)
CHOLEST SERPL-MCNC: 196 MG/DL
CREAT SERPL-MCNC: 0.88 MG/DL (ref 0.67–1.17)
DEPRECATED HCO3 PLAS-SCNC: 24 MMOL/L (ref 22–29)
EGFRCR SERPLBLD CKD-EPI 2021: >90 ML/MIN/1.73M2
FASTING STATUS PATIENT QL REPORTED: NO
FASTING STATUS PATIENT QL REPORTED: NO
GLUCOSE SERPL-MCNC: 84 MG/DL (ref 70–99)
HDLC SERPL-MCNC: 35 MG/DL
LDLC SERPL CALC-MCNC: 90 MG/DL
NONHDLC SERPL-MCNC: 161 MG/DL
POTASSIUM SERPL-SCNC: 4.2 MMOL/L (ref 3.4–5.3)
PROT SERPL-MCNC: 7.4 G/DL (ref 6.4–8.3)
SODIUM SERPL-SCNC: 141 MMOL/L (ref 135–145)
TRIGL SERPL-MCNC: 356 MG/DL

## 2024-06-28 PROCEDURE — 82306 VITAMIN D 25 HYDROXY: CPT

## 2024-06-28 PROCEDURE — 80061 LIPID PANEL: CPT

## 2024-06-28 PROCEDURE — 80053 COMPREHEN METABOLIC PANEL: CPT

## 2024-06-28 PROCEDURE — 99396 PREV VISIT EST AGE 40-64: CPT | Performed by: FAMILY MEDICINE

## 2024-06-28 PROCEDURE — 99214 OFFICE O/P EST MOD 30 MIN: CPT | Mod: 25 | Performed by: FAMILY MEDICINE

## 2024-06-28 PROCEDURE — 36415 COLL VENOUS BLD VENIPUNCTURE: CPT

## 2024-06-28 RX ORDER — FLUTICASONE PROPIONATE 50 MCG
1-2 SPRAY, SUSPENSION (ML) NASAL DAILY
Qty: 16 G | Refills: 1 | Status: SHIPPED | OUTPATIENT
Start: 2024-06-28

## 2024-06-28 RX ORDER — CYCLOBENZAPRINE HCL 5 MG
TABLET ORAL
Qty: 60 TABLET | Refills: 4 | Status: SHIPPED | OUTPATIENT
Start: 2024-06-28

## 2024-06-28 RX ORDER — LOSARTAN POTASSIUM 25 MG/1
25 TABLET ORAL DAILY
Qty: 30 TABLET | Refills: 1 | Status: SHIPPED | OUTPATIENT
Start: 2024-06-28 | End: 2024-08-14

## 2024-06-28 ASSESSMENT — LIFESTYLE VARIABLES
HOW MANY STANDARD DRINKS CONTAINING ALCOHOL DO YOU HAVE ON A TYPICAL DAY: 1 OR 2
AUDIT-C TOTAL SCORE: 4
HOW OFTEN DO YOU HAVE A DRINK CONTAINING ALCOHOL: 4 OR MORE TIMES A WEEK
SKIP TO QUESTIONS 9-10: 1
HOW OFTEN DO YOU HAVE SIX OR MORE DRINKS ON ONE OCCASION: NEVER

## 2024-06-28 ASSESSMENT — PAIN SCALES - GENERAL: PAINLEVEL: NO PAIN (0)
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
## 2024-06-29 LAB — VIT D+METAB SERPL-MCNC: 33 NG/ML (ref 20–50)

## 2024-08-14 ENCOUNTER — LAB (OUTPATIENT)
Dept: LAB | Facility: OTHER | Age: 49
End: 2024-08-14
Attending: FAMILY MEDICINE
Payer: COMMERCIAL

## 2024-08-14 ENCOUNTER — OFFICE VISIT (OUTPATIENT)
Dept: FAMILY MEDICINE | Facility: OTHER | Age: 49
End: 2024-08-14
Attending: FAMILY MEDICINE
Payer: COMMERCIAL

## 2024-08-14 VITALS
HEIGHT: 71 IN | WEIGHT: 203 LBS | SYSTOLIC BLOOD PRESSURE: 127 MMHG | OXYGEN SATURATION: 96 % | BODY MASS INDEX: 28.42 KG/M2 | RESPIRATION RATE: 16 BRPM | TEMPERATURE: 97.4 F | HEART RATE: 71 BPM | DIASTOLIC BLOOD PRESSURE: 82 MMHG

## 2024-08-14 DIAGNOSIS — I10 ESSENTIAL HYPERTENSION, BENIGN: Primary | ICD-10-CM

## 2024-08-14 DIAGNOSIS — I10 ESSENTIAL HYPERTENSION, BENIGN: ICD-10-CM

## 2024-08-14 LAB
ANION GAP SERPL CALCULATED.3IONS-SCNC: 8 MMOL/L (ref 7–15)
BUN SERPL-MCNC: 12.2 MG/DL (ref 6–20)
CALCIUM SERPL-MCNC: 9.5 MG/DL (ref 8.8–10.4)
CHLORIDE SERPL-SCNC: 105 MMOL/L (ref 98–107)
CREAT SERPL-MCNC: 0.94 MG/DL (ref 0.67–1.17)
EGFRCR SERPLBLD CKD-EPI 2021: >90 ML/MIN/1.73M2
GLUCOSE SERPL-MCNC: 112 MG/DL (ref 70–99)
HCO3 SERPL-SCNC: 27 MMOL/L (ref 22–29)
POTASSIUM SERPL-SCNC: 4 MMOL/L (ref 3.4–5.3)
SODIUM SERPL-SCNC: 140 MMOL/L (ref 135–145)

## 2024-08-14 PROCEDURE — G2211 COMPLEX E/M VISIT ADD ON: HCPCS | Performed by: FAMILY MEDICINE

## 2024-08-14 PROCEDURE — 99213 OFFICE O/P EST LOW 20 MIN: CPT | Performed by: FAMILY MEDICINE

## 2024-08-14 PROCEDURE — 36415 COLL VENOUS BLD VENIPUNCTURE: CPT

## 2024-08-14 PROCEDURE — 80048 BASIC METABOLIC PNL TOTAL CA: CPT

## 2024-08-14 RX ORDER — LOSARTAN POTASSIUM 25 MG/1
25 TABLET ORAL DAILY
Qty: 90 TABLET | Refills: 2 | Status: SHIPPED | OUTPATIENT
Start: 2024-08-14

## 2024-08-14 ASSESSMENT — PAIN SCALES - GENERAL: PAINLEVEL: NO PAIN (0)

## 2024-08-14 NOTE — PROGRESS NOTES
The longitudinal plan of care for the diagnosis(es)/condition(s) as documented were addressed during this visit. Due to the added complexity in care, I will continue to support Gerson in the subsequent management and with ongoing continuity of care.    Answers submitted by the patient for this visit:  Hypertension Visit (Submitted on 8/14/2024)  Chief Complaint: Chronic problems general questions HPI Form  Do you check your blood pressure regularly outside of the clinic?: Yes  Are your blood pressures ever more than 140 on the top number (systolic) OR more than 90 on the bottom number (diastolic)? (For example, greater than 140/90): Yes  Are you following a low salt diet?: No  General Questionnaire (Submitted on 8/14/2024)  Chief Complaint: Chronic problems general questions HPI Form  How many servings of fruits and vegetables do you eat daily?: 0-1  On average, how many sweetened beverages do you drink each day (Examples: soda, juice, sweet tea, etc.  Do NOT count diet or artificially sweetened beverages)?: 2  How many minutes a day do you exercise enough to make your heart beat faster?: 30 to 60  How many days a week do you exercise enough to make your heart beat faster?: 4  How many days per week do you miss taking your medication?: 1  What makes it hard for you to take your medication every day?: remembering to take

## 2024-08-14 NOTE — PROGRESS NOTES
Assessment & Plan     Essential hypertension, benign  Doing well, continue  - Basic metabolic panel; Future  - losartan (COZAAR) 25 MG tablet; Take 1 tablet (25 mg) by mouth daily    Patient was agreeable to this plan and had no further questions.  There are no Patient Instructions on file for this visit.    No follow-ups on file.    Natalee Nieto is a 49 year old, presenting for the following health issues:  Hypertension        8/14/2024     2:05 PM   Additional Questions   Roomed by Ruby Vallecillo   Accompanied by None         8/14/2024     2:05 PM   Patient Reported Additional Medications   Patient reports taking the following new medications None     History of Present Illness       Hypertension: He presents for follow up of hypertension.  He does check blood pressure  regularly outside of the clinic. Outside blood pressures have been over 140/90. He does not follow a low salt diet.     He eats 0-1 servings of fruits and vegetables daily.He consumes 2 sweetened beverage(s) daily.He exercises with enough effort to increase his heart rate 30 to 60 minutes per day.  He exercises with enough effort to increase his heart rate 4 days per week. He is missing 1 dose(s) of medications per week.  He is not taking prescribed medications regularly due to remembering to take.       Hypertension Follow-up    Do you check your blood pressure regularly outside of the clinic? Yes   Are you following a low salt diet? No  Are your blood pressures ever more than 140 on the top number (systolic) OR more   than 90 on the bottom number (diastolic), for example 140/90? Yes      Review of Systems  Constitutional, neuro, ENT, endocrine, pulmonary, cardiac, gastrointestinal, genitourinary, musculoskeletal, integument and psychiatric systems are negative, except as otherwise noted.      Objective    /82 (BP Location: Right arm, Patient Position: Sitting, Cuff Size: Adult Regular)   Pulse 71   Temp 97.4  F (36.3  C)  "(Tympanic)   Resp 16   Ht 1.803 m (5' 11\")   Wt 92.1 kg (203 lb)   SpO2 96%   BMI 28.31 kg/m    Body mass index is 28.31 kg/m .  Physical Exam   GENERAL: alert and no distress  RESP: lungs clear to auscultation - no rales, rhonchi or wheezes  CV: regular rate and rhythm, normal S1 S2, no S3 or S4, no murmur, click or rub, no peripheral edema  MS: no gross musculoskeletal defects noted, no edema  PSYCH: mentation appears normal, affect normal/bright    Results for orders placed or performed in visit on 08/14/24   Basic metabolic panel     Status: Abnormal   Result Value Ref Range    Sodium 140 135 - 145 mmol/L    Potassium 4.0 3.4 - 5.3 mmol/L    Chloride 105 98 - 107 mmol/L    Carbon Dioxide (CO2) 27 22 - 29 mmol/L    Anion Gap 8 7 - 15 mmol/L    Urea Nitrogen 12.2 6.0 - 20.0 mg/dL    Creatinine 0.94 0.67 - 1.17 mg/dL    GFR Estimate >90 >60 mL/min/1.73m2    Calcium 9.5 8.8 - 10.4 mg/dL    Glucose 112 (H) 70 - 99 mg/dL       The longitudinal plan of care for the diagnosis(es)/condition(s) as documented were addressed during this visit. Due to the added complexity in care, I will continue to support Gerson in the subsequent management and with ongoing continuity of care.    Answers submitted by the patient for this visit:  Hypertension Visit (Submitted on 8/14/2024)  Chief Complaint: Chronic problems general questions HPI Form  Do you check your blood pressure regularly outside of the clinic?: Yes  Are your blood pressures ever more than 140 on the top number (systolic) OR more than 90 on the bottom number (diastolic)? (For example, greater than 140/90): Yes  Are you following a low salt diet?: No  General Questionnaire (Submitted on 8/14/2024)  Chief Complaint: Chronic problems general questions HPI Form  How many servings of fruits and vegetables do you eat daily?: 0-1  On average, how many sweetened beverages do you drink each day (Examples: soda, juice, sweet tea, etc.  Do NOT count diet or artificially " sweetened beverages)?: 2  How many minutes a day do you exercise enough to make your heart beat faster?: 30 to 60  How many days a week do you exercise enough to make your heart beat faster?: 4  How many days per week do you miss taking your medication?: 1  What makes it hard for you to take your medication every day?: remembering to take    Signed Electronically by: Jesica Clarke MD

## 2024-11-03 ENCOUNTER — HOSPITAL ENCOUNTER (EMERGENCY)
Facility: HOSPITAL | Age: 49
Discharge: HOME | End: 2024-11-03
Attending: INTERNAL MEDICINE | Admitting: INTERNAL MEDICINE
Payer: COMMERCIAL

## 2024-11-03 ENCOUNTER — APPOINTMENT (OUTPATIENT)
Dept: GENERAL RADIOLOGY | Facility: HOSPITAL | Age: 49
End: 2024-11-03
Attending: INTERNAL MEDICINE
Payer: COMMERCIAL

## 2024-11-03 VITALS
BODY MASS INDEX: 28.47 KG/M2 | RESPIRATION RATE: 16 BRPM | TEMPERATURE: 97.9 F | WEIGHT: 204.15 LBS | HEART RATE: 78 BPM | OXYGEN SATURATION: 98 % | DIASTOLIC BLOOD PRESSURE: 99 MMHG | SYSTOLIC BLOOD PRESSURE: 141 MMHG

## 2024-11-03 DIAGNOSIS — R07.89 ATYPICAL CHEST PAIN: ICD-10-CM

## 2024-11-03 DIAGNOSIS — F43.22 ADJUSTMENT DISORDER WITH ANXIOUS MOOD: ICD-10-CM

## 2024-11-03 LAB
ALBUMIN SERPL BCG-MCNC: 4.5 G/DL (ref 3.5–5.2)
ALP SERPL-CCNC: 90 U/L (ref 40–150)
ALT SERPL W P-5'-P-CCNC: 52 U/L (ref 0–70)
ANION GAP SERPL CALCULATED.3IONS-SCNC: 15 MMOL/L (ref 7–15)
AST SERPL W P-5'-P-CCNC: 24 U/L (ref 0–45)
ATRIAL RATE - MUSE: 84 BPM
BASOPHILS # BLD AUTO: 0 10E3/UL (ref 0–0.2)
BASOPHILS NFR BLD AUTO: 1 %
BILIRUB SERPL-MCNC: 0.2 MG/DL
BUN SERPL-MCNC: 15.3 MG/DL (ref 6–20)
CALCIUM SERPL-MCNC: 9.4 MG/DL (ref 8.8–10.4)
CHLORIDE SERPL-SCNC: 104 MMOL/L (ref 98–107)
CREAT SERPL-MCNC: 1.13 MG/DL (ref 0.67–1.17)
DIASTOLIC BLOOD PRESSURE - MUSE: NORMAL MMHG
EGFRCR SERPLBLD CKD-EPI 2021: 81 ML/MIN/1.73M2
EOSINOPHIL # BLD AUTO: 0.1 10E3/UL (ref 0–0.7)
EOSINOPHIL NFR BLD AUTO: 1 %
ERYTHROCYTE [DISTWIDTH] IN BLOOD BY AUTOMATED COUNT: 12.7 % (ref 10–15)
GLUCOSE SERPL-MCNC: 122 MG/DL (ref 70–99)
HCO3 SERPL-SCNC: 23 MMOL/L (ref 22–29)
HCT VFR BLD AUTO: 42 % (ref 40–53)
HGB BLD-MCNC: 14.1 G/DL (ref 13.3–17.7)
HOLD SPECIMEN: NORMAL
IMM GRANULOCYTES # BLD: 0 10E3/UL
IMM GRANULOCYTES NFR BLD: 0 %
INTERPRETATION ECG - MUSE: NORMAL
LYMPHOCYTES # BLD AUTO: 2.3 10E3/UL (ref 0.8–5.3)
LYMPHOCYTES NFR BLD AUTO: 30 %
MCH RBC QN AUTO: 30.7 PG (ref 26.5–33)
MCHC RBC AUTO-ENTMCNC: 33.6 G/DL (ref 31.5–36.5)
MCV RBC AUTO: 92 FL (ref 78–100)
MONOCYTES # BLD AUTO: 0.7 10E3/UL (ref 0–1.3)
MONOCYTES NFR BLD AUTO: 9 %
NEUTROPHILS # BLD AUTO: 4.5 10E3/UL (ref 1.6–8.3)
NEUTROPHILS NFR BLD AUTO: 59 %
NRBC # BLD AUTO: 0 10E3/UL
NRBC BLD AUTO-RTO: 0 /100
P AXIS - MUSE: 54 DEGREES
PLATELET # BLD AUTO: 286 10E3/UL (ref 150–450)
POTASSIUM SERPL-SCNC: 4.2 MMOL/L (ref 3.4–5.3)
PR INTERVAL - MUSE: 160 MS
PROT SERPL-MCNC: 6.9 G/DL (ref 6.4–8.3)
QRS DURATION - MUSE: 102 MS
QT - MUSE: 370 MS
QTC - MUSE: 437 MS
R AXIS - MUSE: 43 DEGREES
RBC # BLD AUTO: 4.59 10E6/UL (ref 4.4–5.9)
SODIUM SERPL-SCNC: 142 MMOL/L (ref 135–145)
SYSTOLIC BLOOD PRESSURE - MUSE: NORMAL MMHG
T AXIS - MUSE: 21 DEGREES
TROPONIN T SERPL HS-MCNC: <6 NG/L
VENTRICULAR RATE- MUSE: 84 BPM
WBC # BLD AUTO: 7.6 10E3/UL (ref 4–11)

## 2024-11-03 PROCEDURE — 99285 EMERGENCY DEPT VISIT HI MDM: CPT | Mod: 25

## 2024-11-03 PROCEDURE — 82040 ASSAY OF SERUM ALBUMIN: CPT | Performed by: INTERNAL MEDICINE

## 2024-11-03 PROCEDURE — 85025 COMPLETE CBC W/AUTO DIFF WBC: CPT | Performed by: INTERNAL MEDICINE

## 2024-11-03 PROCEDURE — 93010 ELECTROCARDIOGRAM REPORT: CPT | Performed by: INTERNAL MEDICINE

## 2024-11-03 PROCEDURE — 84484 ASSAY OF TROPONIN QUANT: CPT | Performed by: INTERNAL MEDICINE

## 2024-11-03 PROCEDURE — 93005 ELECTROCARDIOGRAM TRACING: CPT

## 2024-11-03 PROCEDURE — 71045 X-RAY EXAM CHEST 1 VIEW: CPT

## 2024-11-03 PROCEDURE — 99284 EMERGENCY DEPT VISIT MOD MDM: CPT | Performed by: INTERNAL MEDICINE

## 2024-11-03 PROCEDURE — 250N000013 HC RX MED GY IP 250 OP 250 PS 637: Performed by: INTERNAL MEDICINE

## 2024-11-03 PROCEDURE — 36415 COLL VENOUS BLD VENIPUNCTURE: CPT | Performed by: INTERNAL MEDICINE

## 2024-11-03 RX ORDER — DIAZEPAM 5 MG/1
10 TABLET ORAL ONCE
Status: COMPLETED | OUTPATIENT
Start: 2024-11-03 | End: 2024-11-03

## 2024-11-03 RX ADMIN — DIAZEPAM 10 MG: 5 TABLET ORAL at 01:33

## 2024-11-03 ASSESSMENT — COLUMBIA-SUICIDE SEVERITY RATING SCALE - C-SSRS
6. HAVE YOU EVER DONE ANYTHING, STARTED TO DO ANYTHING, OR PREPARED TO DO ANYTHING TO END YOUR LIFE?: NO
1. IN THE PAST MONTH, HAVE YOU WISHED YOU WERE DEAD OR WISHED YOU COULD GO TO SLEEP AND NOT WAKE UP?: NO
2. HAVE YOU ACTUALLY HAD ANY THOUGHTS OF KILLING YOURSELF IN THE PAST MONTH?: NO
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
## 2024-11-06 ASSESSMENT — ENCOUNTER SYMPTOMS
FEVER: 0
DIZZINESS: 0
COUGH: 0
HEMATURIA: 0
NAUSEA: 0
RHINORRHEA: 0
HEADACHES: 0
VOMITING: 0
CHILLS: 0
SORE THROAT: 0
ARTHRALGIAS: 0
APPETITE CHANGE: 0
ABDOMINAL PAIN: 0
SHORTNESS OF BREATH: 0
DYSURIA: 0
SLEEP DISTURBANCE: 1
DIARRHEA: 0
MYALGIAS: 0
FATIGUE: 0
EYE REDNESS: 0
NERVOUS/ANXIOUS: 1
NECK STIFFNESS: 0
ACTIVITY CHANGE: 0
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
## 2024-11-08 ENCOUNTER — OFFICE VISIT (OUTPATIENT)
Dept: FAMILY MEDICINE | Facility: OTHER | Age: 49
End: 2024-11-08
Attending: FAMILY MEDICINE
Payer: COMMERCIAL

## 2024-11-08 VITALS
HEART RATE: 80 BPM | DIASTOLIC BLOOD PRESSURE: 86 MMHG | SYSTOLIC BLOOD PRESSURE: 142 MMHG | BODY MASS INDEX: 28.52 KG/M2 | TEMPERATURE: 97.9 F | HEIGHT: 71 IN | OXYGEN SATURATION: 97 % | WEIGHT: 203.7 LBS

## 2024-11-08 DIAGNOSIS — G47.09 OTHER INSOMNIA: Primary | ICD-10-CM

## 2024-11-08 DIAGNOSIS — Z11.3 SCREENING EXAMINATION FOR STD (SEXUALLY TRANSMITTED DISEASE): ICD-10-CM

## 2024-11-08 LAB
C TRACH DNA SPEC QL PROBE+SIG AMP: NEGATIVE
N GONORRHOEA DNA SPEC QL NAA+PROBE: NEGATIVE

## 2024-11-08 PROCEDURE — 87389 HIV-1 AG W/HIV-1&-2 AB AG IA: CPT | Performed by: FAMILY MEDICINE

## 2024-11-08 PROCEDURE — 87491 CHLMYD TRACH DNA AMP PROBE: CPT | Performed by: FAMILY MEDICINE

## 2024-11-08 PROCEDURE — 86695 HERPES SIMPLEX TYPE 1 TEST: CPT | Performed by: FAMILY MEDICINE

## 2024-11-08 PROCEDURE — 99214 OFFICE O/P EST MOD 30 MIN: CPT | Performed by: FAMILY MEDICINE

## 2024-11-08 PROCEDURE — G2211 COMPLEX E/M VISIT ADD ON: HCPCS | Performed by: FAMILY MEDICINE

## 2024-11-08 PROCEDURE — 36415 COLL VENOUS BLD VENIPUNCTURE: CPT | Performed by: FAMILY MEDICINE

## 2024-11-08 PROCEDURE — 87591 N.GONORRHOEAE DNA AMP PROB: CPT | Performed by: FAMILY MEDICINE

## 2024-11-08 PROCEDURE — 86780 TREPONEMA PALLIDUM: CPT | Performed by: FAMILY MEDICINE

## 2024-11-08 PROCEDURE — 86803 HEPATITIS C AB TEST: CPT | Performed by: FAMILY MEDICINE

## 2024-11-08 PROCEDURE — 86696 HERPES SIMPLEX TYPE 2 TEST: CPT | Performed by: FAMILY MEDICINE

## 2024-11-08 RX ORDER — TRAZODONE HYDROCHLORIDE 100 MG/1
100 TABLET ORAL AT BEDTIME
Qty: 30 TABLET | Refills: 1 | Status: SHIPPED | OUTPATIENT
Start: 2024-11-08

## 2024-11-08 ASSESSMENT — ENCOUNTER SYMPTOMS: NERVOUS/ANXIOUS: 1

## 2024-11-08 ASSESSMENT — ANXIETY QUESTIONNAIRES
6. BECOMING EASILY ANNOYED OR IRRITABLE: SEVERAL DAYS
GAD7 TOTAL SCORE: 7
2. NOT BEING ABLE TO STOP OR CONTROL WORRYING: SEVERAL DAYS
7. FEELING AFRAID AS IF SOMETHING AWFUL MIGHT HAPPEN: SEVERAL DAYS
GAD7 TOTAL SCORE: 7
8. IF YOU CHECKED OFF ANY PROBLEMS, HOW DIFFICULT HAVE THESE MADE IT FOR YOU TO DO YOUR WORK, TAKE CARE OF THINGS AT HOME, OR GET ALONG WITH OTHER PEOPLE?: NOT DIFFICULT AT ALL
3. WORRYING TOO MUCH ABOUT DIFFERENT THINGS: SEVERAL DAYS
7. FEELING AFRAID AS IF SOMETHING AWFUL MIGHT HAPPEN: SEVERAL DAYS
IF YOU CHECKED OFF ANY PROBLEMS ON THIS QUESTIONNAIRE, HOW DIFFICULT HAVE THESE PROBLEMS MADE IT FOR YOU TO DO YOUR WORK, TAKE CARE OF THINGS AT HOME, OR GET ALONG WITH OTHER PEOPLE: NOT DIFFICULT AT ALL
5. BEING SO RESTLESS THAT IT IS HARD TO SIT STILL: SEVERAL DAYS
1. FEELING NERVOUS, ANXIOUS, OR ON EDGE: SEVERAL DAYS
GAD7 TOTAL SCORE: 7
4. TROUBLE RELAXING: SEVERAL DAYS

## 2024-11-08 ASSESSMENT — PATIENT HEALTH QUESTIONNAIRE - PHQ9
10. IF YOU CHECKED OFF ANY PROBLEMS, HOW DIFFICULT HAVE THESE PROBLEMS MADE IT FOR YOU TO DO YOUR WORK, TAKE CARE OF THINGS AT HOME, OR GET ALONG WITH OTHER PEOPLE: NOT DIFFICULT AT ALL
SUM OF ALL RESPONSES TO PHQ QUESTIONS 1-9: 6
SUM OF ALL RESPONSES TO PHQ QUESTIONS 1-9: 6

## 2024-11-08 ASSESSMENT — PAIN SCALES - GENERAL: PAINLEVEL_OUTOF10: NO PAIN (0)

## 2024-11-08 NOTE — PROGRESS NOTES
Additional Comments: Patient to follow up with PMD and PRN with me The longitudinal plan of care for the diagnosis(es)/condition(s) as documented were addressed during this visit. Due to the added complexity in care, I will continue to support Gerson in the subsequent management and with ongoing continuity of care.    Assessment & Plan     Other insomnia  Discussed using trazodone for sleep, use the 'my plan' praveena and has a good support network  Follow-up 6-7 wks  - traZODone (DESYREL) 100 MG tablet; Take 1 tablet (100 mg) by mouth at bedtime.    Screening examination for STD (sexually transmitted disease)  Wife had infidelity at least 5 different partners in the last 17 yrs, divorce moving forward  - GC/Chlamydia by PCR - [facility]; Future  - HIV Antigen Antibody Combo; Future  - Hepatitis C Screen Reflex to HCV RNA Quant and Genotype; Future  - Treponema Abs w Reflex to RPR and Titer; Future  - Herpes Simplex Virus 1 and 2 IgG; Future  - GC/Chlamydia by PCR - [facility]  - HIV Antigen Antibody Combo  - Hepatitis C Screen Reflex to HCV RNA Quant and Genotype  - Treponema Abs w Reflex to RPR and Titer  - Herpes Simplex Virus 1 and 2 IgG    Patient was agreeable to this plan and had no further questions.  There are no Patient Instructions on file for this visit.    No follow-ups on file.    Subjective   Gerson is a 49 year old, presenting for the following health issues:  Depression, Anxiety, and STD        11/8/2024     3:53 PM   Additional Questions   Roomed by Marisa CASON   Accompanied by self     Anxiety    STD    History of Present Illness       Reason for visit:  Questions He is missing 1 dose(s) of medications per week.       Depression and Anxiety   How are you doing with your depression since your last visit? Worsened   How are you doing with your anxiety since your last visit?  Worsened   Are you having other symptoms that might be associated with depression or anxiety? Yes:  anxious etc    Have you had a significant life event? Relationship Concerns   Do you have any concerns with  Add 46347 Cpt? (Important Note: In 2017 The Use Of 63272 Is Being Tracked By Cms To Determine Future Global Period Reimbursement For Global Periods): yes your use of alcohol or other drugs? No    Social History     Tobacco Use    Smoking status: Former     Current packs/day: 0.00     Average packs/day: 0.5 packs/day for 20.0 years (10.0 ttl pk-yrs)     Types: Cigarettes     Start date: 1996     Quit date: 2016     Years since quittin.9    Smokeless tobacco: Never    Tobacco comments:     quit 2016   Vaping Use    Vaping status: Never Used   Substance Use Topics    Alcohol use: Yes     Alcohol/week: 4.0 standard drinks of alcohol     Types: 4 Glasses of wine per week     Comment: 1-3/wk         3/9/2023     9:25 AM 2024     1:25 AM 2024     3:10 PM   PHQ   PHQ-9 Total Score 0 0 6    Q9: Thoughts of better off dead/self-harm past 2 weeks Not at all Not at all  Not at all        Patient-reported         2021    11:00 AM 2024     1:27 AM 2024     3:11 PM   SOHA-7 SCORE   Total Score  0 (minimal anxiety) 7 (mild anxiety)   Total Score 1 0 7        Patient-reported         2024     3:10 PM   Last PHQ-9   1.  Little interest or pleasure in doing things 1    2.  Feeling down, depressed, or hopeless 1    3.  Trouble falling or staying asleep, or sleeping too much 1    4.  Feeling tired or having little energy 0    5.  Poor appetite or overeating 1    6.  Feeling bad about yourself 1    7.  Trouble concentrating 1    8.  Moving slowly or restless 0    Q9: Thoughts of better off dead/self-harm past 2 weeks 0    PHQ-9 Total Score 6        Patient-reported         2024     3:11 PM   SOHA-7    1. Feeling nervous, anxious, or on edge 1    2. Not being able to stop or control worrying 1    3. Worrying too much about different things 1    4. Trouble relaxing 1    5. Being so restless that it is hard to sit still 1    6. Becoming easily annoyed or irritable 1    7. Feeling afraid, as if something awful might happen 1    SOHA-7 Total Score 7    If you checked any problems, how difficult have they made it for you to do your work, take  "care of things at home, or get along with other people? Not difficult at all        Patient-reported       Suicide Assessment Five-step Evaluation and Treatment (SAFE-T)      Review of Systems  Constitutional, neuro, ENT, endocrine, pulmonary, cardiac, gastrointestinal, genitourinary, musculoskeletal, integument and psychiatric systems are negative, except as otherwise noted.      Objective    BP (!) 142/86 (BP Location: Left arm, Patient Position: Sitting, Cuff Size: Adult Large)   Pulse 80   Temp 97.9  F (36.6  C) (Tympanic)   Ht 1.803 m (5' 11\")   Wt 92.4 kg (203 lb 11.2 oz)   SpO2 97%   BMI 28.41 kg/m    Body mass index is 28.41 kg/m .  Physical Exam   GENERAL: alert and no distress  HENT: lower lip reveals small vesicles  RESP: lungs clear to auscultation - no rales, rhonchi or wheezes  CV: regular rate and rhythm, normal S1 S2, no S3 or S4, no murmur, click or rub, no peripheral edema  MS: no gross musculoskeletal defects noted, no edema  PSYCH: mentation appears normal, affect normal/bright    Results for orders placed or performed in visit on 11/08/24   GC/Chlamydia by PCR - [facility]     Status: Normal    Specimen: Urine, Voided   Result Value Ref Range    Chlamydia Trachomatis Negative Negative    Neisseria gonorrhoeae Negative Negative    Narrative    Assay performed using [email or web address] real-time, reverse-transcriptase PCR.           Signed Electronically by: Jesica Clarke MD    " Detail Level: Generalized

## 2024-11-08 NOTE — PROGRESS NOTES
{PROVIDER CHARTING PREFERENCE:432058}    Natalee Nieto is a 49 year old, presenting for the following health issues:  Depression, Anxiety, and STD  {(!) Visit Details have not yet been documented.  Please enter Visit Details and then use this list to pull in documentation. (Optional):962175}  History of Present Illness       Reason for visit:  Questions He is missing 1 dose(s) of medications per week.       {SUPERLIST (Optional):564983}  {additonal problems for provider to add (Optional):218172}    {ROS Picklists (Optional):590531}      Objective    There were no vitals taken for this visit.  There is no height or weight on file to calculate BMI.  Physical Exam   {Exam List (Optional):399405}    {Diagnostic Test Results (Optional):118503}        Signed Electronically by: Jesica Clarke MD  {Email feedback regarding this note to [email or web address]   :[id number]}

## 2024-11-09 LAB
HCV AB SERPL QL IA: NONREACTIVE
HIV 1+2 AB+HIV1 P24 AG SERPL QL IA: NONREACTIVE
HSV1 IGG SERPL QL IA: 0.14 INDEX
HSV1 IGG SERPL QL IA: NORMAL
HSV2 IGG SERPL QL IA: 0.06 INDEX
HSV2 IGG SERPL QL IA: NORMAL
T PALLIDUM AB SER QL: NONREACTIVE

## 2025-04-02 ENCOUNTER — APPOINTMENT (OUTPATIENT)
Dept: CT IMAGING | Facility: HOSPITAL | Age: 50
End: 2025-04-02
Attending: INTERNAL MEDICINE
Payer: COMMERCIAL

## 2025-04-02 ENCOUNTER — HOSPITAL ENCOUNTER (EMERGENCY)
Facility: HOSPITAL | Age: 50
Discharge: HOME | End: 2025-04-02
Attending: INTERNAL MEDICINE
Payer: COMMERCIAL

## 2025-04-02 VITALS
HEART RATE: 84 BPM | DIASTOLIC BLOOD PRESSURE: 110 MMHG | SYSTOLIC BLOOD PRESSURE: 154 MMHG | WEIGHT: 198 LBS | BODY MASS INDEX: 27.72 KG/M2 | TEMPERATURE: 98 F | HEIGHT: 71 IN | RESPIRATION RATE: 17 BRPM | OXYGEN SATURATION: 94 %

## 2025-04-02 DIAGNOSIS — N20.0 KIDNEY STONE: ICD-10-CM

## 2025-04-02 DIAGNOSIS — N13.2 HYDRONEPHROSIS WITH URINARY OBSTRUCTION DUE TO URETERAL CALCULUS: ICD-10-CM

## 2025-04-02 LAB
ANION GAP SERPL CALCULATED.3IONS-SCNC: 12 MMOL/L (ref 7–15)
BASOPHILS # BLD AUTO: 0 10E3/UL (ref 0–0.2)
BASOPHILS NFR BLD AUTO: 0 %
BUN SERPL-MCNC: 14.6 MG/DL (ref 6–20)
CALCIUM SERPL-MCNC: 9 MG/DL (ref 8.8–10.4)
CHLORIDE SERPL-SCNC: 101 MMOL/L (ref 98–107)
CREAT SERPL-MCNC: 1.09 MG/DL (ref 0.67–1.17)
EGFRCR SERPLBLD CKD-EPI 2021: 84 ML/MIN/1.73M2
EOSINOPHIL # BLD AUTO: 0.1 10E3/UL (ref 0–0.7)
EOSINOPHIL NFR BLD AUTO: 1 %
ERYTHROCYTE [DISTWIDTH] IN BLOOD BY AUTOMATED COUNT: 12.5 % (ref 10–15)
GLUCOSE SERPL-MCNC: 106 MG/DL (ref 70–99)
HCO3 SERPL-SCNC: 26 MMOL/L (ref 22–29)
HCT VFR BLD AUTO: 45 % (ref 40–53)
HGB BLD-MCNC: 15.2 G/DL (ref 13.3–17.7)
HOLD SPECIMEN: NORMAL
IMM GRANULOCYTES # BLD: 0 10E3/UL
IMM GRANULOCYTES NFR BLD: 0 %
LYMPHOCYTES # BLD AUTO: 1.9 10E3/UL (ref 0.8–5.3)
LYMPHOCYTES NFR BLD AUTO: 20 %
MCH RBC QN AUTO: 31.1 PG (ref 26.5–33)
MCHC RBC AUTO-ENTMCNC: 33.8 G/DL (ref 31.5–36.5)
MCV RBC AUTO: 92 FL (ref 78–100)
MONOCYTES # BLD AUTO: 0.7 10E3/UL (ref 0–1.3)
MONOCYTES NFR BLD AUTO: 7 %
NEUTROPHILS # BLD AUTO: 6.8 10E3/UL (ref 1.6–8.3)
NEUTROPHILS NFR BLD AUTO: 71 %
NRBC # BLD AUTO: 0 10E3/UL
NRBC BLD AUTO-RTO: 0 /100
PLATELET # BLD AUTO: 324 10E3/UL (ref 150–450)
POTASSIUM SERPL-SCNC: 3.6 MMOL/L (ref 3.4–5.3)
RBC # BLD AUTO: 4.89 10E6/UL (ref 4.4–5.9)
SODIUM SERPL-SCNC: 139 MMOL/L (ref 135–145)
WBC # BLD AUTO: 9.5 10E3/UL (ref 4–11)

## 2025-04-02 PROCEDURE — 74176 CT ABD & PELVIS W/O CONTRAST: CPT

## 2025-04-02 PROCEDURE — 80048 BASIC METABOLIC PNL TOTAL CA: CPT | Performed by: INTERNAL MEDICINE

## 2025-04-02 PROCEDURE — 96375 TX/PRO/DX INJ NEW DRUG ADDON: CPT

## 2025-04-02 PROCEDURE — 82310 ASSAY OF CALCIUM: CPT | Performed by: INTERNAL MEDICINE

## 2025-04-02 PROCEDURE — 250N000011 HC RX IP 250 OP 636: Mod: JZ | Performed by: INTERNAL MEDICINE

## 2025-04-02 PROCEDURE — 36415 COLL VENOUS BLD VENIPUNCTURE: CPT | Performed by: INTERNAL MEDICINE

## 2025-04-02 PROCEDURE — 99285 EMERGENCY DEPT VISIT HI MDM: CPT | Mod: 25

## 2025-04-02 PROCEDURE — 99284 EMERGENCY DEPT VISIT MOD MDM: CPT | Performed by: INTERNAL MEDICINE

## 2025-04-02 PROCEDURE — 96374 THER/PROPH/DIAG INJ IV PUSH: CPT

## 2025-04-02 PROCEDURE — 74176 CT ABD & PELVIS W/O CONTRAST: CPT | Mod: 26 | Performed by: RADIOLOGY

## 2025-04-02 PROCEDURE — 85025 COMPLETE CBC W/AUTO DIFF WBC: CPT | Performed by: INTERNAL MEDICINE

## 2025-04-02 RX ORDER — ONDANSETRON 2 MG/ML
4 INJECTION INTRAMUSCULAR; INTRAVENOUS ONCE
Status: COMPLETED | OUTPATIENT
Start: 2025-04-02 | End: 2025-04-02

## 2025-04-02 RX ORDER — KETOROLAC TROMETHAMINE 30 MG/ML
30 INJECTION, SOLUTION INTRAMUSCULAR; INTRAVENOUS ONCE
Status: COMPLETED | OUTPATIENT
Start: 2025-04-02 | End: 2025-04-02

## 2025-04-02 RX ORDER — OXYCODONE HYDROCHLORIDE 5 MG/1
5 TABLET ORAL EVERY 6 HOURS PRN
Qty: 10 TABLET | Refills: 0 | Status: SHIPPED | OUTPATIENT
Start: 2025-04-02

## 2025-04-02 RX ORDER — TAMSULOSIN HYDROCHLORIDE 0.4 MG/1
0.4 CAPSULE ORAL DAILY
Qty: 7 CAPSULE | Refills: 0 | Status: SHIPPED | OUTPATIENT
Start: 2025-04-02

## 2025-04-02 RX ADMIN — HYDROMORPHONE HYDROCHLORIDE 1 MG: 1 INJECTION, SOLUTION INTRAMUSCULAR; INTRAVENOUS; SUBCUTANEOUS at 00:39

## 2025-04-02 RX ADMIN — KETOROLAC TROMETHAMINE 30 MG: 30 INJECTION, SOLUTION INTRAMUSCULAR at 00:40

## 2025-04-02 RX ADMIN — ONDANSETRON 4 MG: 2 INJECTION INTRAMUSCULAR; INTRAVENOUS at 00:41

## 2025-04-02 ASSESSMENT — ACTIVITIES OF DAILY LIVING (ADL): ADLS_ACUITY_SCORE: 41

## 2025-04-02 NOTE — ED TRIAGE NOTES
C/o left flank pain starting today that wraps around to LLQ of abd, worsening over the last two hrs. Hx kidney stones, states it feels the same. Denies hematuria.

## 2025-04-04 ASSESSMENT — ENCOUNTER SYMPTOMS
VOICE CHANGE: 0
NUMBNESS: 0
CONFUSION: 0
DIZZINESS: 0
ABDOMINAL DISTENTION: 0
WEAKNESS: 0
ABDOMINAL PAIN: 1
NECK PAIN: 0
COLOR CHANGE: 0
LIGHT-HEADEDNESS: 0
BLOOD IN STOOL: 0
CHEST TIGHTNESS: 0
FREQUENCY: 0
VOMITING: 0
CHILLS: 0
ANAL BLEEDING: 0
SHORTNESS OF BREATH: 0
DYSURIA: 0
COUGH: 0
FLANK PAIN: 0
WHEEZING: 0
SLEEP DISTURBANCE: 0
HEADACHES: 0
BACK PAIN: 0
DIAPHORESIS: 0
NAUSEA: 1
PALPITATIONS: 0
FEVER: 0
MYALGIAS: 0
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
## 2025-04-08 ENCOUNTER — OFFICE VISIT (OUTPATIENT)
Dept: UROLOGY | Facility: OTHER | Age: 50
End: 2025-04-08
Attending: UROLOGY
Payer: COMMERCIAL

## 2025-04-08 VITALS
HEART RATE: 84 BPM | SYSTOLIC BLOOD PRESSURE: 151 MMHG | DIASTOLIC BLOOD PRESSURE: 93 MMHG | OXYGEN SATURATION: 99 % | WEIGHT: 197.97 LBS | RESPIRATION RATE: 18 BRPM | BODY MASS INDEX: 27.72 KG/M2 | HEIGHT: 71 IN

## 2025-04-08 DIAGNOSIS — N13.2 HYDRONEPHROSIS WITH URINARY OBSTRUCTION DUE TO URETERAL CALCULUS: ICD-10-CM

## 2025-04-08 DIAGNOSIS — N20.0 KIDNEY STONE: ICD-10-CM

## 2025-04-08 RX ORDER — TAMSULOSIN HYDROCHLORIDE 0.4 MG/1
0.4 CAPSULE ORAL DAILY
Qty: 30 CAPSULE | Refills: 1 | Status: SHIPPED | OUTPATIENT
Start: 2025-04-08

## 2025-04-08 RX ORDER — KETOROLAC TROMETHAMINE 10 MG/1
10 TABLET, FILM COATED ORAL EVERY 6 HOURS PRN
Qty: 20 TABLET | Refills: 1 | Status: SHIPPED | OUTPATIENT
Start: 2025-04-08

## 2025-04-08 ASSESSMENT — PAIN SCALES - GENERAL: PAINLEVEL_OUTOF10: MODERATE PAIN (5)
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
## 2025-04-15 ENCOUNTER — PREP FOR PROCEDURE (OUTPATIENT)
Dept: UROLOGY | Facility: OTHER | Age: 50
End: 2025-04-15

## 2025-04-15 DIAGNOSIS — N20.1 URETERAL STONE: Primary | ICD-10-CM

## 2025-04-15 NOTE — PROGRESS NOTES
Left CYSTOURETEROSCOPY, WITH LITHOTRIPSY USING LASER AND URETERAL STENT INSERTION  Preformed by Dr. Heart on 4/23  Pre-op on 4/17  Pre-op education provided    Thank you,     Kayla MOSQUERA BSN, PHN  RN Care Coordinator Urology  [facility]

## 2025-04-16 ENCOUNTER — ANESTHESIA EVENT (OUTPATIENT)
Dept: SURGERY | Facility: HOSPITAL | Age: 50
End: 2025-04-16
Payer: COMMERCIAL

## 2025-04-16 ASSESSMENT — LIFESTYLE VARIABLES: TOBACCO_USE: 1
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
## 2025-04-17 ENCOUNTER — LAB (OUTPATIENT)
Dept: LAB | Facility: OTHER | Age: 50
End: 2025-04-17
Attending: NURSE PRACTITIONER
Payer: COMMERCIAL

## 2025-04-17 ENCOUNTER — OFFICE VISIT (OUTPATIENT)
Dept: UROLOGY | Facility: OTHER | Age: 50
End: 2025-04-17
Attending: NURSE PRACTITIONER
Payer: COMMERCIAL

## 2025-04-17 ENCOUNTER — TELEPHONE (OUTPATIENT)
Dept: UROLOGY | Facility: OTHER | Age: 50
End: 2025-04-17

## 2025-04-17 VITALS
TEMPERATURE: 96.5 F | SYSTOLIC BLOOD PRESSURE: 152 MMHG | HEART RATE: 83 BPM | DIASTOLIC BLOOD PRESSURE: 102 MMHG | OXYGEN SATURATION: 98 %

## 2025-04-17 DIAGNOSIS — I10 ESSENTIAL HYPERTENSION, BENIGN: ICD-10-CM

## 2025-04-17 DIAGNOSIS — Z01.818 PRE-OPERATIVE EXAMINATION: ICD-10-CM

## 2025-04-17 DIAGNOSIS — Z01.818 PRE-OPERATIVE EXAMINATION: Primary | ICD-10-CM

## 2025-04-17 LAB
ALBUMIN UR-MCNC: NEGATIVE MG/DL
APPEARANCE UR: CLEAR
ATRIAL RATE - MUSE: 71 BPM
BILIRUB UR QL STRIP: NEGATIVE
COLOR UR AUTO: YELLOW
DIASTOLIC BLOOD PRESSURE - MUSE: NORMAL MMHG
GLUCOSE UR STRIP-MCNC: NEGATIVE MG/DL
HGB UR QL STRIP: NEGATIVE
INTERPRETATION ECG - MUSE: NORMAL
KETONES UR STRIP-MCNC: NEGATIVE MG/DL
LEUKOCYTE ESTERASE UR QL STRIP: NEGATIVE
MUCOUS THREADS #/AREA URNS LPF: PRESENT /LPF
NITRATE UR QL: NEGATIVE
P AXIS - MUSE: 53 DEGREES
PH UR STRIP: 5.5 [PH] (ref 4.7–8)
PR INTERVAL - MUSE: 178 MS
QRS DURATION - MUSE: 98 MS
QT - MUSE: 406 MS
QTC - MUSE: 441 MS
R AXIS - MUSE: 46 DEGREES
RBC URINE: 0 /HPF
SP GR UR STRIP: 1.02 (ref 1–1.03)
SQUAMOUS EPITHELIAL: 0 /HPF
SYSTOLIC BLOOD PRESSURE - MUSE: NORMAL MMHG
T AXIS - MUSE: 20 DEGREES
UROBILINOGEN UR STRIP-MCNC: NORMAL MG/DL
VENTRICULAR RATE- MUSE: 71 BPM
WBC URINE: 0 /HPF

## 2025-04-17 ASSESSMENT — PAIN SCALES - GENERAL: PAINLEVEL_OUTOF10: NO PAIN (0)
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
## 2025-04-17 NOTE — TELEPHONE ENCOUNTER
Called pt to inform him that we need to get a urine sample. Pt stated he could do that later today. Informed pt that if he couldn't get it today to get it on Monday. Pt in agreement. Penny Porter LPN
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
## 2025-04-23 ENCOUNTER — ANESTHESIA (OUTPATIENT)
Dept: SURGERY | Facility: HOSPITAL | Age: 50
End: 2025-04-23
Payer: COMMERCIAL

## 2025-04-23 ENCOUNTER — APPOINTMENT (OUTPATIENT)
Dept: GENERAL RADIOLOGY | Facility: HOSPITAL | Age: 50
End: 2025-04-23
Attending: UROLOGY
Payer: COMMERCIAL

## 2025-04-23 ENCOUNTER — HOSPITAL ENCOUNTER (OUTPATIENT)
Facility: HOSPITAL | Age: 50
Discharge: HOME | End: 2025-04-23
Attending: UROLOGY | Admitting: UROLOGY
Payer: COMMERCIAL

## 2025-04-23 ENCOUNTER — PREP FOR PROCEDURE (OUTPATIENT)
Dept: UROLOGY | Facility: OTHER | Age: 50
End: 2025-04-23

## 2025-04-23 VITALS
WEIGHT: 199 LBS | OXYGEN SATURATION: 97 % | DIASTOLIC BLOOD PRESSURE: 105 MMHG | BODY MASS INDEX: 27.86 KG/M2 | SYSTOLIC BLOOD PRESSURE: 141 MMHG | HEART RATE: 76 BPM | RESPIRATION RATE: 16 BRPM | HEIGHT: 71 IN | TEMPERATURE: 97.3 F

## 2025-04-23 DIAGNOSIS — N20.1 LEFT URETERAL STONE: Primary | ICD-10-CM

## 2025-04-23 DIAGNOSIS — N20.1 URETERAL STONE: Primary | ICD-10-CM

## 2025-04-23 PROCEDURE — C2617 STENT, NON-COR, TEM W/O DEL: HCPCS | Performed by: UROLOGY

## 2025-04-23 PROCEDURE — 250N000011 HC RX IP 250 OP 636: Performed by: UROLOGY

## 2025-04-23 PROCEDURE — 710N000012 HC RECOVERY PHASE 2, PER MINUTE: Performed by: UROLOGY

## 2025-04-23 PROCEDURE — C1769 GUIDE WIRE: HCPCS | Performed by: UROLOGY

## 2025-04-23 PROCEDURE — 258N000003 HC RX IP 258 OP 636: Performed by: NURSE PRACTITIONER

## 2025-04-23 PROCEDURE — 999N000141 HC STATISTIC PRE-PROCEDURE NURSING ASSESSMENT: Performed by: UROLOGY

## 2025-04-23 PROCEDURE — 272N000001 HC OR GENERAL SUPPLY STERILE: Performed by: UROLOGY

## 2025-04-23 PROCEDURE — 250N000009 HC RX 250: Performed by: NURSE ANESTHETIST, CERTIFIED REGISTERED

## 2025-04-23 PROCEDURE — 250N000025 HC SEVOFLURANE, PER MIN: Performed by: UROLOGY

## 2025-04-23 PROCEDURE — 710N000010 HC RECOVERY PHASE 1, LEVEL 2, PER MIN: Performed by: UROLOGY

## 2025-04-23 PROCEDURE — 52332 CYSTOSCOPY AND TREATMENT: CPT | Mod: LT | Performed by: UROLOGY

## 2025-04-23 PROCEDURE — 250N000011 HC RX IP 250 OP 636: Performed by: NURSE ANESTHETIST, CERTIFIED REGISTERED

## 2025-04-23 PROCEDURE — 999N000179 XR SURGERY CARM FLUORO LESS THAN 5 MIN W STILLS

## 2025-04-23 PROCEDURE — 360N000084 HC SURGERY LEVEL 4 W/ FLUORO, PER MIN: Performed by: UROLOGY

## 2025-04-23 PROCEDURE — 370N000017 HC ANESTHESIA TECHNICAL FEE, PER MIN: Performed by: UROLOGY

## 2025-04-23 PROCEDURE — C1758 CATHETER, URETERAL: HCPCS | Performed by: UROLOGY

## 2025-04-23 DEVICE — STENT URETERAL SOF-CURL TECOFLEX 6FRX28CM SSC6028
Type: IMPLANTABLE DEVICE | Site: URETHRA | Status: NON-FUNCTIONAL
Removed: 2025-05-07

## 2025-04-23 RX ORDER — ONDANSETRON 2 MG/ML
INJECTION INTRAMUSCULAR; INTRAVENOUS PRN
Status: DISCONTINUED | OUTPATIENT
Start: 2025-04-23 | End: 2025-04-23

## 2025-04-23 RX ORDER — FENTANYL CITRATE 50 UG/ML
25 INJECTION, SOLUTION INTRAMUSCULAR; INTRAVENOUS EVERY 5 MIN PRN
Status: DISCONTINUED | OUTPATIENT
Start: 2025-04-23 | End: 2025-04-23 | Stop reason: HOSPADM

## 2025-04-23 RX ORDER — CEFAZOLIN SODIUM/WATER 2 G/20 ML
SYRINGE (ML) INTRAVENOUS
Status: COMPLETED
Start: 2025-04-23 | End: 2025-04-23

## 2025-04-23 RX ORDER — KETOROLAC TROMETHAMINE 10 MG/1
10 TABLET, FILM COATED ORAL EVERY 6 HOURS PRN
Status: ON HOLD | COMMUNITY
End: 2025-05-07

## 2025-04-23 RX ORDER — ONDANSETRON 4 MG/1
4 TABLET, ORALLY DISINTEGRATING ORAL EVERY 30 MIN PRN
Status: DISCONTINUED | OUTPATIENT
Start: 2025-04-23 | End: 2025-04-23 | Stop reason: HOSPADM

## 2025-04-23 RX ORDER — SODIUM CHLORIDE, SODIUM LACTATE, POTASSIUM CHLORIDE, CALCIUM CHLORIDE 600; 310; 30; 20 MG/100ML; MG/100ML; MG/100ML; MG/100ML
INJECTION, SOLUTION INTRAVENOUS CONTINUOUS
Status: DISCONTINUED | OUTPATIENT
Start: 2025-04-23 | End: 2025-04-23 | Stop reason: HOSPADM

## 2025-04-23 RX ORDER — DEXAMETHASONE SODIUM PHOSPHATE 4 MG/ML
4 INJECTION, SOLUTION INTRA-ARTICULAR; INTRALESIONAL; INTRAMUSCULAR; INTRAVENOUS; SOFT TISSUE
Status: DISCONTINUED | OUTPATIENT
Start: 2025-04-23 | End: 2025-04-23 | Stop reason: HOSPADM

## 2025-04-23 RX ORDER — ONDANSETRON 2 MG/ML
4 INJECTION INTRAMUSCULAR; INTRAVENOUS EVERY 30 MIN PRN
Status: DISCONTINUED | OUTPATIENT
Start: 2025-04-23 | End: 2025-04-23 | Stop reason: HOSPADM

## 2025-04-23 RX ORDER — PROPOFOL 10 MG/ML
INJECTION, EMULSION INTRAVENOUS PRN
Status: DISCONTINUED | OUTPATIENT
Start: 2025-04-23 | End: 2025-04-23

## 2025-04-23 RX ORDER — LIDOCAINE HYDROCHLORIDE 20 MG/ML
INJECTION, SOLUTION INFILTRATION; PERINEURAL PRN
Status: DISCONTINUED | OUTPATIENT
Start: 2025-04-23 | End: 2025-04-23

## 2025-04-23 RX ORDER — CEFAZOLIN SODIUM/WATER 2 G/20 ML
2 SYRINGE (ML) INTRAVENOUS
Status: COMPLETED | OUTPATIENT
Start: 2025-04-23 | End: 2025-04-23

## 2025-04-23 RX ORDER — DEXAMETHASONE SODIUM PHOSPHATE 4 MG/ML
INJECTION, SOLUTION INTRA-ARTICULAR; INTRALESIONAL; INTRAMUSCULAR; INTRAVENOUS; SOFT TISSUE PRN
Status: DISCONTINUED | OUTPATIENT
Start: 2025-04-23 | End: 2025-04-23

## 2025-04-23 RX ORDER — IOPAMIDOL 612 MG/ML
30 INJECTION, SOLUTION INTRAVASCULAR ONCE
Status: DISCONTINUED | OUTPATIENT
Start: 2025-04-23 | End: 2025-04-23 | Stop reason: HOSPADM

## 2025-04-23 RX ORDER — NALOXONE HYDROCHLORIDE 0.4 MG/ML
0.1 INJECTION, SOLUTION INTRAMUSCULAR; INTRAVENOUS; SUBCUTANEOUS
Status: DISCONTINUED | OUTPATIENT
Start: 2025-04-23 | End: 2025-04-23 | Stop reason: HOSPADM

## 2025-04-23 RX ORDER — FENTANYL CITRATE 50 UG/ML
50 INJECTION, SOLUTION INTRAMUSCULAR; INTRAVENOUS EVERY 5 MIN PRN
Status: DISCONTINUED | OUTPATIENT
Start: 2025-04-23 | End: 2025-04-23 | Stop reason: HOSPADM

## 2025-04-23 RX ORDER — HYDROMORPHONE HYDROCHLORIDE 1 MG/ML
0.2 INJECTION, SOLUTION INTRAMUSCULAR; INTRAVENOUS; SUBCUTANEOUS EVERY 5 MIN PRN
Status: DISCONTINUED | OUTPATIENT
Start: 2025-04-23 | End: 2025-04-23 | Stop reason: HOSPADM

## 2025-04-23 RX ORDER — FENTANYL CITRATE 50 UG/ML
INJECTION, SOLUTION INTRAMUSCULAR; INTRAVENOUS PRN
Status: DISCONTINUED | OUTPATIENT
Start: 2025-04-23 | End: 2025-04-23

## 2025-04-23 RX ORDER — CEFAZOLIN SODIUM/WATER 2 G/20 ML
2 SYRINGE (ML) INTRAVENOUS SEE ADMIN INSTRUCTIONS
Status: DISCONTINUED | OUTPATIENT
Start: 2025-04-23 | End: 2025-04-23 | Stop reason: HOSPADM

## 2025-04-23 RX ORDER — HYDROMORPHONE HYDROCHLORIDE 1 MG/ML
0.4 INJECTION, SOLUTION INTRAMUSCULAR; INTRAVENOUS; SUBCUTANEOUS EVERY 5 MIN PRN
Status: DISCONTINUED | OUTPATIENT
Start: 2025-04-23 | End: 2025-04-23 | Stop reason: HOSPADM

## 2025-04-23 RX ORDER — KETOROLAC TROMETHAMINE 30 MG/ML
15 INJECTION, SOLUTION INTRAMUSCULAR; INTRAVENOUS ONCE
Status: COMPLETED | OUTPATIENT
Start: 2025-04-23 | End: 2025-04-23

## 2025-04-23 RX ORDER — LIDOCAINE 40 MG/G
CREAM TOPICAL
Status: DISCONTINUED | OUTPATIENT
Start: 2025-04-23 | End: 2025-04-23 | Stop reason: HOSPADM

## 2025-04-23 RX ADMIN — SODIUM CHLORIDE, SODIUM LACTATE, POTASSIUM CHLORIDE, AND CALCIUM CHLORIDE: .6; .31; .03; .02 INJECTION, SOLUTION INTRAVENOUS at 07:33

## 2025-04-23 RX ADMIN — DEXAMETHASONE SODIUM PHOSPHATE 10 MG: 4 INJECTION, SOLUTION INTRA-ARTICULAR; INTRALESIONAL; INTRAMUSCULAR; INTRAVENOUS; SOFT TISSUE at 08:23

## 2025-04-23 RX ADMIN — PROPOFOL 200 MG: 10 INJECTION, EMULSION INTRAVENOUS at 08:16

## 2025-04-23 RX ADMIN — Medication 2 G: at 08:08

## 2025-04-23 RX ADMIN — FENTANYL CITRATE 100 MCG: 50 INJECTION INTRAMUSCULAR; INTRAVENOUS at 08:14

## 2025-04-23 RX ADMIN — LIDOCAINE HYDROCHLORIDE 80 MG: 20 INJECTION, SOLUTION INFILTRATION; PERINEURAL at 08:16

## 2025-04-23 RX ADMIN — KETOROLAC TROMETHAMINE 15 MG: 30 INJECTION, SOLUTION INTRAMUSCULAR at 09:25

## 2025-04-23 RX ADMIN — ONDANSETRON 4 MG: 2 INJECTION INTRAMUSCULAR; INTRAVENOUS at 08:23

## 2025-04-23 ASSESSMENT — ACTIVITIES OF DAILY LIVING (ADL)
ADLS_ACUITY_SCORE: 18

## 2025-04-23 NOTE — OR NURSING
Patient and responsible adult given discharge instructions with no questions regarding instructions. Lorraine score 20/20. Pain level 0/10.  Discharged from unit via walkin. Patient discharged to home with kaykay huerta  .

## 2025-04-23 NOTE — DISCHARGE INSTRUCTIONS
After Anesthesia (Sleep Medicine)  What should I do after anesthesia?  You should rest and relax for the next 24 hours. Avoid risky or difficult (strenuous) activity. A responsible adult should stay with you overnight.  Don't drive or use any heavy equipment for 24 hours. Even if you feel normal, your reactions may be affected by the sleep medicine given to you.  Don't drink alcohol or make any important decisions for 24 hours.  Slowly get back to your regular diet, as you feel able.  How should I expect to feel?  It's normal to feel dizzy, light-headed, or faint for up to a full day after anesthesia or while taking pain medicine. If this happens:   Sit down for a few minutes before standing.  Have someone help you when you get up to walk or use the bathroom.  If you have nausea (feel sick to your stomach) or vomit (throw up):   Drink clear liquids (such as apple juice, ginger ale, broth, or 7UP) until you feel better.  If you feel sick to your stomach, or you keep vomiting for 24 hours, please call the doctor.  What else should I know?  You might have a dry mouth, sore throat, muscle aches, or trouble sleeping. These should go away after 24 hours.  Please contact your doctor if you have any other symptoms that concern you, such as fever, pain, bleeding, fluid drainage, swelling, or headache, or if it's been over 8 to 10 hours and you still aren't able to pee (urinate).  If you have a history of sleep apnea, it's very important to use your CPAP machine for the next 24 hours when you nap or sleep.   For informational purposes only. Not to replace the advice of your health care provider. Copyright   2023 [employer]. All rights reserved. Clinically reviewed by Randy Eli MD. [email or web address] [id number] - REV 09/23.    NO IBUPROFEN UNTIL 3:30PM

## 2025-04-23 NOTE — ANESTHESIA PROCEDURE NOTES
Airway       Patient location during procedure: OR  Staff -        CRNA: Sandro Braun APRN CRNA       Performed By: CRNA  Consent for Airway        Urgency: elective  Indications and Patient Condition       Indications for airway management: shobha-procedural       Induction type:intravenous       Mask difficulty assessment: 1 - vent by mask    Final Airway Details       Final airway type: supraglottic airway    Supraglottic Airway Details        Type: LMA       Brand: I-Gel       LMA size: 5    Post intubation assessment        Placement verified by: capnometry, equal breath sounds and chest rise        Number of attempts at approach: 1       Secured with: plastic tape       Ease of procedure: easy       Dentition: Intact and Unchanged

## 2025-04-23 NOTE — OP NOTE
[facility] Operative Note    Pre-operative diagnosis: Ureteral stone [N20.1]   Post-operative diagnosis *left ureteral stone   Procedure: CYSTOURETEROSCOPY AND left URETERAL STENT INSERTION - Left   Surgeon: Nilson Heart MD   Assistants(s): None   Estimated blood loss: none    Specimens: None   Findings: Tight left ureter, 6x28cm stent     Indication for procedure: The patient is a 49-year-old man with symptomatic left ureteral stone now for treatment.  He understands the risks of the procedure include but are not limited to bleeding, infection, ureteral damage, the need for staged ureteroscopy.  He signed informed consent.    Procedure in detail: The patient was brought to the operating room and placed supine on the operating table.  Following the induction of general anesthesia the patient was prepped and draped in dorsal lithotomy.  Cystoscopy was performed with a 22 Iraqi cystoscope and 30 degree lens.  The anterior urethra was normal.  The prostate was not obstructing.  The bladder was surveyed completely and was normal.  The ureteral orifices were normal in position and appearance.  The left ureteral orifice was cannulated with a 5 Iraqi open-ended catheter and a retrograde pyelogram outlined the collecting system demonstrating an obstruction in the proximal left ureter.  A 0.038 Glidewire was advanced through the catheter into the left renal pelvis under fluoroscopy and the catheter was removed.  Due to the tight feeling sensation inside the left ureter with the ureteral catheter the decision was made to place a ureteral stent.  A 6 Iraqi by 28 cm ureteral stent was placed with a good coil in the renal pelvis and a good coil in the bladder.  The bladder was emptied, the cystoscope was removed, the patient was washed, dried, awakened from anesthesia, and transferred stable from the operating room to the PACU.    Plan: The patient will return in approximately 2 weeks time for left ureteroscopy laser  lithotripsy and possible stent replacement.

## 2025-04-23 NOTE — ANESTHESIA CARE TRANSFER NOTE
Patient: Gerson Powers    Procedure: Procedure(s):  CYSTOURETEROSCOPY AND left URETERAL STENT INSERTION       Diagnosis: Ureteral stone [N20.1]  Diagnosis Additional Information: No value filed.    Anesthesia Type:   General     Note:    Oropharynx: spontaneously breathing  Level of Consciousness: awake  Oxygen Supplementation: room air    Independent Airway: airway patency satisfactory and stable  Dentition: dentition unchanged  Vital Signs Stable: post-procedure vital signs reviewed and stable  Report to RN Given: handoff report given  Patient transferred to: PACU    Handoff Report: Identifed the Patient, Identified the Reponsible Provider, Reviewed the pertinent medical history, Discussed the surgical course, Reviewed Intra-OP anesthesia mangement and issues during anesthesia, Set expectations for post-procedure period and Allowed opportunity for questions and acknowledgement of understanding  Vitals:  Vitals Value Taken Time   /92 04/23/25 0846   Temp     Pulse 85 04/23/25 0847   Resp 16 04/23/25 0847   SpO2 94 % 04/23/25 0847   Vitals shown include unfiled device data.    Electronically Signed By: DAYA Leyva CRNA  April 23, 2025  8:49 AM

## 2025-04-23 NOTE — INTERVAL H&P NOTE
"I have reviewed the virtual H&P that is linked to this encounter. The physical exam performed by anesthesia during this surgical encounter serves as the physical portion of that the virtual H&P. There are no significant changes from that history and physical as noted.    Clinical Conditions Present on Arrival:  Clinically Significant Risk Factors Present on Admission                       # Overweight: Estimated body mass index is 27.75 kg/m  as calculated from the following:    Height as of this encounter: 1.803 m (5' 11\").    Weight as of this encounter: 90.3 kg (199 lb).       "

## 2025-04-23 NOTE — PROGRESS NOTES
cysto, left ureteroscopy, laser lithotripsy, possible stent replacement   Preformed by Dr. Heart on 5/7/25   Pre-op education provided  No pre-op needed  C-ARM  Pt aware surgery education may call today as well    Thank you,     Kayla MOSQUERA, BSN, PHN  RN Care Coordinator Urology  [facility]

## 2025-04-30 ENCOUNTER — ANESTHESIA EVENT (OUTPATIENT)
Dept: SURGERY | Facility: HOSPITAL | Age: 50
End: 2025-04-30
Payer: COMMERCIAL

## 2025-04-30 ASSESSMENT — LIFESTYLE VARIABLES: TOBACCO_USE: 1
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
## 2025-05-07 ENCOUNTER — HOSPITAL ENCOUNTER (OUTPATIENT)
Facility: HOSPITAL | Age: 50
Discharge: HOME | End: 2025-05-07
Attending: UROLOGY | Admitting: UROLOGY
Payer: COMMERCIAL

## 2025-05-07 ENCOUNTER — ANESTHESIA (OUTPATIENT)
Dept: SURGERY | Facility: HOSPITAL | Age: 50
End: 2025-05-07
Payer: COMMERCIAL

## 2025-05-07 ENCOUNTER — APPOINTMENT (OUTPATIENT)
Dept: GENERAL RADIOLOGY | Facility: HOSPITAL | Age: 50
End: 2025-05-07
Attending: UROLOGY
Payer: COMMERCIAL

## 2025-05-07 VITALS
HEART RATE: 73 BPM | OXYGEN SATURATION: 100 % | RESPIRATION RATE: 18 BRPM | DIASTOLIC BLOOD PRESSURE: 88 MMHG | SYSTOLIC BLOOD PRESSURE: 126 MMHG | WEIGHT: 198 LBS | BODY MASS INDEX: 27.72 KG/M2 | HEIGHT: 71 IN | TEMPERATURE: 97.6 F

## 2025-05-07 DIAGNOSIS — N20.1 LEFT URETERAL STONE: Primary | ICD-10-CM

## 2025-05-07 PROCEDURE — 258N000003 HC RX IP 258 OP 636: Performed by: NURSE PRACTITIONER

## 2025-05-07 PROCEDURE — C2617 STENT, NON-COR, TEM W/O DEL: HCPCS | Performed by: UROLOGY

## 2025-05-07 PROCEDURE — 258N000003 HC RX IP 258 OP 636: Performed by: NURSE ANESTHETIST, CERTIFIED REGISTERED

## 2025-05-07 PROCEDURE — 250N000011 HC RX IP 250 OP 636: Performed by: NURSE ANESTHETIST, CERTIFIED REGISTERED

## 2025-05-07 PROCEDURE — C1894 INTRO/SHEATH, NON-LASER: HCPCS | Performed by: UROLOGY

## 2025-05-07 PROCEDURE — 250N000009 HC RX 250: Performed by: NURSE ANESTHETIST, CERTIFIED REGISTERED

## 2025-05-07 PROCEDURE — 360N000084 HC SURGERY LEVEL 4 W/ FLUORO, PER MIN: Performed by: UROLOGY

## 2025-05-07 PROCEDURE — C1769 GUIDE WIRE: HCPCS | Performed by: UROLOGY

## 2025-05-07 PROCEDURE — C1758 CATHETER, URETERAL: HCPCS | Performed by: UROLOGY

## 2025-05-07 PROCEDURE — 272N000001 HC OR GENERAL SUPPLY STERILE: Performed by: UROLOGY

## 2025-05-07 PROCEDURE — 52353 CYSTOURETERO W/LITHOTRIPSY: CPT | Mod: LT | Performed by: UROLOGY

## 2025-05-07 PROCEDURE — 250N000025 HC SEVOFLURANE, PER MIN: Performed by: UROLOGY

## 2025-05-07 PROCEDURE — 710N000012 HC RECOVERY PHASE 2, PER MINUTE: Performed by: UROLOGY

## 2025-05-07 PROCEDURE — 370N000017 HC ANESTHESIA TECHNICAL FEE, PER MIN: Performed by: UROLOGY

## 2025-05-07 PROCEDURE — 710N000010 HC RECOVERY PHASE 1, LEVEL 2, PER MIN: Performed by: UROLOGY

## 2025-05-07 PROCEDURE — 250N000011 HC RX IP 250 OP 636: Performed by: UROLOGY

## 2025-05-07 PROCEDURE — 999N000179 XR SURGERY CARM FLUORO LESS THAN 5 MIN W STILLS

## 2025-05-07 PROCEDURE — 999N000141 HC STATISTIC PRE-PROCEDURE NURSING ASSESSMENT: Performed by: UROLOGY

## 2025-05-07 PROCEDURE — 82365 CALCULUS SPECTROSCOPY: CPT | Performed by: UROLOGY

## 2025-05-07 PROCEDURE — 255N000002 HC RX 255 OP 636: Performed by: UROLOGY

## 2025-05-07 DEVICE — 6.0 FR. (2MM) X 28CM SOF-CURL URETERAL STENT
Type: IMPLANTABLE DEVICE | Site: URETHRA | Status: FUNCTIONAL
Brand: SOF-CURL

## 2025-05-07 RX ORDER — DEXAMETHASONE SODIUM PHOSPHATE 4 MG/ML
INJECTION, SOLUTION INTRA-ARTICULAR; INTRALESIONAL; INTRAMUSCULAR; INTRAVENOUS; SOFT TISSUE PRN
Status: DISCONTINUED | OUTPATIENT
Start: 2025-05-07 | End: 2025-05-07

## 2025-05-07 RX ORDER — HYDROMORPHONE HYDROCHLORIDE 1 MG/ML
0.4 INJECTION, SOLUTION INTRAMUSCULAR; INTRAVENOUS; SUBCUTANEOUS EVERY 5 MIN PRN
Status: DISCONTINUED | OUTPATIENT
Start: 2025-05-07 | End: 2025-05-07 | Stop reason: HOSPADM

## 2025-05-07 RX ORDER — ONDANSETRON 2 MG/ML
4 INJECTION INTRAMUSCULAR; INTRAVENOUS EVERY 30 MIN PRN
Status: DISCONTINUED | OUTPATIENT
Start: 2025-05-07 | End: 2025-05-07 | Stop reason: HOSPADM

## 2025-05-07 RX ORDER — ONDANSETRON 2 MG/ML
INJECTION INTRAMUSCULAR; INTRAVENOUS PRN
Status: DISCONTINUED | OUTPATIENT
Start: 2025-05-07 | End: 2025-05-07

## 2025-05-07 RX ORDER — LIDOCAINE HYDROCHLORIDE 20 MG/ML
INJECTION, SOLUTION INFILTRATION; PERINEURAL PRN
Status: DISCONTINUED | OUTPATIENT
Start: 2025-05-07 | End: 2025-05-07

## 2025-05-07 RX ORDER — KETOROLAC TROMETHAMINE 30 MG/ML
INJECTION, SOLUTION INTRAMUSCULAR; INTRAVENOUS PRN
Status: DISCONTINUED | OUTPATIENT
Start: 2025-05-07 | End: 2025-05-07

## 2025-05-07 RX ORDER — FENTANYL CITRATE 50 UG/ML
50 INJECTION, SOLUTION INTRAMUSCULAR; INTRAVENOUS EVERY 5 MIN PRN
Status: DISCONTINUED | OUTPATIENT
Start: 2025-05-07 | End: 2025-05-07 | Stop reason: HOSPADM

## 2025-05-07 RX ORDER — SODIUM CHLORIDE, SODIUM LACTATE, POTASSIUM CHLORIDE, CALCIUM CHLORIDE 600; 310; 30; 20 MG/100ML; MG/100ML; MG/100ML; MG/100ML
INJECTION, SOLUTION INTRAVENOUS CONTINUOUS
Status: DISCONTINUED | OUTPATIENT
Start: 2025-05-07 | End: 2025-05-07 | Stop reason: HOSPADM

## 2025-05-07 RX ORDER — FENTANYL CITRATE 50 UG/ML
INJECTION, SOLUTION INTRAMUSCULAR; INTRAVENOUS PRN
Status: DISCONTINUED | OUTPATIENT
Start: 2025-05-07 | End: 2025-05-07

## 2025-05-07 RX ORDER — IOPAMIDOL 612 MG/ML
INJECTION, SOLUTION INTRAVASCULAR PRN
Status: DISCONTINUED | OUTPATIENT
Start: 2025-05-07 | End: 2025-05-07 | Stop reason: HOSPADM

## 2025-05-07 RX ORDER — NALOXONE HYDROCHLORIDE 0.4 MG/ML
0.1 INJECTION, SOLUTION INTRAMUSCULAR; INTRAVENOUS; SUBCUTANEOUS
Status: DISCONTINUED | OUTPATIENT
Start: 2025-05-07 | End: 2025-05-07 | Stop reason: HOSPADM

## 2025-05-07 RX ORDER — PROPOFOL 10 MG/ML
INJECTION, EMULSION INTRAVENOUS PRN
Status: DISCONTINUED | OUTPATIENT
Start: 2025-05-07 | End: 2025-05-07

## 2025-05-07 RX ORDER — CEFAZOLIN SODIUM/WATER 2 G/20 ML
2 SYRINGE (ML) INTRAVENOUS
Status: COMPLETED | OUTPATIENT
Start: 2025-05-07 | End: 2025-05-07

## 2025-05-07 RX ORDER — CEFAZOLIN SODIUM/WATER 2 G/20 ML
2 SYRINGE (ML) INTRAVENOUS SEE ADMIN INSTRUCTIONS
Status: DISCONTINUED | OUTPATIENT
Start: 2025-05-07 | End: 2025-05-07 | Stop reason: HOSPADM

## 2025-05-07 RX ORDER — ONDANSETRON 4 MG/1
4 TABLET, ORALLY DISINTEGRATING ORAL EVERY 30 MIN PRN
Status: DISCONTINUED | OUTPATIENT
Start: 2025-05-07 | End: 2025-05-07 | Stop reason: HOSPADM

## 2025-05-07 RX ORDER — HYDROMORPHONE HYDROCHLORIDE 1 MG/ML
0.2 INJECTION, SOLUTION INTRAMUSCULAR; INTRAVENOUS; SUBCUTANEOUS EVERY 5 MIN PRN
Status: DISCONTINUED | OUTPATIENT
Start: 2025-05-07 | End: 2025-05-07 | Stop reason: HOSPADM

## 2025-05-07 RX ORDER — DEXAMETHASONE SODIUM PHOSPHATE 4 MG/ML
4 INJECTION, SOLUTION INTRA-ARTICULAR; INTRALESIONAL; INTRAMUSCULAR; INTRAVENOUS; SOFT TISSUE
Status: DISCONTINUED | OUTPATIENT
Start: 2025-05-07 | End: 2025-05-07 | Stop reason: HOSPADM

## 2025-05-07 RX ORDER — FENTANYL CITRATE 50 UG/ML
25 INJECTION, SOLUTION INTRAMUSCULAR; INTRAVENOUS EVERY 5 MIN PRN
Status: DISCONTINUED | OUTPATIENT
Start: 2025-05-07 | End: 2025-05-07 | Stop reason: HOSPADM

## 2025-05-07 RX ORDER — LIDOCAINE 40 MG/G
CREAM TOPICAL
Status: DISCONTINUED | OUTPATIENT
Start: 2025-05-07 | End: 2025-05-07 | Stop reason: HOSPADM

## 2025-05-07 RX ADMIN — PHENYLEPHRINE HYDROCHLORIDE 50 MCG: 10 INJECTION INTRAVENOUS at 12:46

## 2025-05-07 RX ADMIN — KETOROLAC TROMETHAMINE 30 MG: 30 INJECTION, SOLUTION INTRAMUSCULAR at 13:08

## 2025-05-07 RX ADMIN — Medication 2 G: at 12:05

## 2025-05-07 RX ADMIN — PHENYLEPHRINE HYDROCHLORIDE 50 MCG: 10 INJECTION INTRAVENOUS at 12:48

## 2025-05-07 RX ADMIN — PHENYLEPHRINE HYDROCHLORIDE 100 MCG: 10 INJECTION INTRAVENOUS at 13:02

## 2025-05-07 RX ADMIN — PHENYLEPHRINE HYDROCHLORIDE 100 MCG: 10 INJECTION INTRAVENOUS at 12:51

## 2025-05-07 RX ADMIN — PROPOFOL 200 MG: 10 INJECTION, EMULSION INTRAVENOUS at 12:12

## 2025-05-07 RX ADMIN — FENTANYL CITRATE 50 MCG: 50 INJECTION INTRAMUSCULAR; INTRAVENOUS at 12:11

## 2025-05-07 RX ADMIN — PROPOFOL 50 MG: 10 INJECTION, EMULSION INTRAVENOUS at 12:14

## 2025-05-07 RX ADMIN — SODIUM CHLORIDE, SODIUM LACTATE, POTASSIUM CHLORIDE, AND CALCIUM CHLORIDE: .6; .31; .03; .02 INJECTION, SOLUTION INTRAVENOUS at 09:08

## 2025-05-07 RX ADMIN — FENTANYL CITRATE 50 MCG: 50 INJECTION INTRAMUSCULAR; INTRAVENOUS at 12:26

## 2025-05-07 RX ADMIN — LIDOCAINE HYDROCHLORIDE 80 MG: 20 INJECTION, SOLUTION INFILTRATION; PERINEURAL at 12:12

## 2025-05-07 RX ADMIN — DEXAMETHASONE SODIUM PHOSPHATE 10 MG: 4 INJECTION, SOLUTION INTRA-ARTICULAR; INTRALESIONAL; INTRAMUSCULAR; INTRAVENOUS; SOFT TISSUE at 12:18

## 2025-05-07 RX ADMIN — ONDANSETRON 4 MG: 2 INJECTION INTRAMUSCULAR; INTRAVENOUS at 12:18

## 2025-05-07 RX ADMIN — PROPOFOL 50 MG: 10 INJECTION, EMULSION INTRAVENOUS at 12:13

## 2025-05-07 ASSESSMENT — ACTIVITIES OF DAILY LIVING (ADL)
ADLS_ACUITY_SCORE: 18
ADLS_ACUITY_SCORE: 20
ADLS_ACUITY_SCORE: 18
ADLS_ACUITY_SCORE: 18

## 2025-05-07 NOTE — ANESTHESIA CARE TRANSFER NOTE
Patient: Gerson Powers    Procedure: Procedure(s):  left cystoureteroscopy, laser lithotripsy,  stent replacement       Diagnosis: Ureteral stone [N20.1]  Diagnosis Additional Information: No value filed.    Anesthesia Type:   General     Note:    Oropharynx: spontaneously breathing  Level of Consciousness: drowsy  Oxygen Supplementation: blow-by O2    Independent Airway: airway patency satisfactory and stable  Dentition: dentition unchanged  Vital Signs Stable: post-procedure vital signs reviewed and stable  Report to RN Given: handoff report given  Patient transferred to: PACU    Handoff Report: Identifed the Patient, Identified the Reponsible Provider, Reviewed the pertinent medical history, Discussed the surgical course, Reviewed Intra-OP anesthesia mangement and issues during anesthesia, Set expectations for post-procedure period and Allowed opportunity for questions and acknowledgement of understanding  Vitals:  Vitals Value Taken Time   BP     Temp     Pulse     Resp     SpO2         Electronically Signed By: DAYA Valdes CRNA  May 7, 2025  1:23 PM

## 2025-05-07 NOTE — ANESTHESIA PROCEDURE NOTES
Airway    Staff -        CRNA: Jorgito Kunz APRN CRNA       Performed By: CRNA  Consent for Airway        Urgency: elective  Indications and Patient Condition       Indications for airway management: shobha-procedural       Induction type:intravenous       Mask difficulty assessment: 0 - not attempted    Final Airway Details       Final airway type: supraglottic airway    Supraglottic Airway Details        Type: LMA       Brand: I-Gel       LMA size: 5    Post intubation assessment        Placement verified by: capnometry, equal breath sounds and chest rise        Number of attempts at approach: 1       Number of other approaches attempted: 0       Secured with: tape       Ease of procedure: easy       Dentition: Intact and Unchanged

## 2025-05-07 NOTE — OP NOTE
[facility] Operative Note    Pre-operative diagnosis: Ureteral stone [N20.1]   Post-operative diagnosis Left ureteral stone   Procedure: left cystoureteroscopy, laser lithotripsy,  stent replacement - Left   Surgeon: Nilson Heart MD   Assistants(s): None   Estimated blood loss: none    Specimens: Left kidney stone   Findings: Left ureteral stone and lower pole left kidney stone. 6.28cm stent replaced.     Indication for procedure: The patient is a 49-year-old man with a left ureteral stone status post stenting now for completion ureteroscopy.  Risks include bleeding, infection, ureteral damage, and the failure to remove all stones.  He was counseled and he signed informed consent.    Procedure in detail: The patient was identified in the preoperative holding area and brought back to the operating room.  Perioperative antibiotics were administered.  He was placed in supine position and general anesthesia was initiated.  He was positioned in dorsolithotomy and prepped and draped for cystoscopy.  Cystourethroscopy was performed with a 22 Malawian cystoscope and a 30 degree lens.  The anterior urethra was normal.  The prostate was not obstructing.  The ureteral stent was grasped and brought out through the urethral meatus.  A 0.038 Glidewire was advanced through the stent into the left renal pelvis under fluoroscopy and the stent was removed.  A 10/12 Malawian 46 cm ureteral access sheath was deployed over the wire and positioned without resistance in the proximal left ureter.  The flexible ureteroscope was advanced through the sheath into the proximal ureter where the obstructing stone was encountered.  The stone was pushed backwards into the renal pelvis.  The stone was fragmented and dusted with a 200  m laser fiber.  The lower pole stone seen on CT scan was extremely difficult to access.  It was unable to be basketed initially.  The 200  m laser fiber was then used to partially fragment the stone and about half  of it came out of the lower pole and was able to be basketed and extracted and sent for analysis.  The remainder of the stone in the lower pole was simply impossible to either laser or basket due to its anatomic location and size and shape.  It had to be left in place.  A full mapping pyelogram was performed demonstrating no extravasation.  There were no other clinically significant stone fragments remaining.  The ureteroscope was removed.  The Glidewire was replaced through the access sheath and the access sheath was removed.  The cystoscope was backloaded over the wire new 6 Martiniquais by 28 cm ureteral stent was placed with a good coil in the renal pelvis and a good coil in the bladder.  The bladder was emptied, the cystoscope was removed, the wire was secured around the phallus with adhesive.  He was washed, dried, awakened from anesthesia and transferred stable from the operating room to the PACU.    Plan: The patient will follow-up in about a week for removal of his stent.  He will then come back about 6 weeks later with a renal ultrasound and 24-hour urine collection.

## 2025-05-07 NOTE — LETTER
[facility]  [address] [address]  Phone: [phone]    May 7, 2025        Gerson MARTÍNEZ Powers  [address] [address] [facility] [address]          To whom it may concern:    RE: Gerson SOLIZ Priya    Patient may return to work on Monday May 12, 2025 with the following:  No restrictions    Please contact me for questions or concerns.      Sincerely,      Nilson Heart MD

## 2025-05-07 NOTE — ANESTHESIA POSTPROCEDURE EVALUATION
Patient: Gerson Powers    Procedure: Procedure(s):  left cystoureteroscopy, laser lithotripsy,  stent replacement       Anesthesia Type:  General    Note:  Disposition: Outpatient   Postop Pain Control: Uneventful            Sign Out: Well controlled pain   PONV: No   Neuro/Psych: Uneventful            Sign Out: Acceptable/Baseline neuro status   Airway/Respiratory: Uneventful            Sign Out: Acceptable/Baseline resp. status   CV/Hemodynamics: Uneventful            Sign Out: Acceptable CV status; No obvious hypovolemia; No obvious fluid overload   Other NRE: NONE   DID A NON-ROUTINE EVENT OCCUR? No       Last vitals:  Vitals Value Taken Time   /82 05/07/25 13:35   Temp 97.6  F (36.4  C) 05/07/25 13:35   Pulse 72 05/07/25 13:37   Resp 14 05/07/25 13:37   SpO2 100 % 05/07/25 13:37   Vitals shown include unfiled device data.    Electronically Signed By: DAYA Hicks CRNA  May 7, 2025  3:09 PM

## 2025-05-07 NOTE — INTERVAL H&P NOTE
"I have reviewed the virtual H&P that is linked to this encounter. The physical exam performed by anesthesia during this surgical encounter serves as the physical portion of that the virtual H&P. There are no significant changes from that history and physical as noted.    Clinical Conditions Present on Arrival:  Clinically Significant Risk Factors Present on Admission                           # Overweight: Estimated body mass index is 27.62 kg/m  as calculated from the following:    Height as of this encounter: 1.803 m (5' 11\").    Weight as of this encounter: 89.8 kg (198 lb).           "

## 2025-05-14 LAB
APPEARANCE STONE: NORMAL
COMPN STONE: NORMAL
SPECIMEN WT: 16 MG

## 2025-05-15 ENCOUNTER — ALLIED HEALTH/NURSE VISIT (OUTPATIENT)
Dept: UROLOGY | Facility: OTHER | Age: 50
End: 2025-05-15
Attending: FAMILY MEDICINE
Payer: COMMERCIAL

## 2025-05-15 DIAGNOSIS — N13.2 HYDRONEPHROSIS WITH URINARY OBSTRUCTION DUE TO URETERAL CALCULUS: ICD-10-CM

## 2025-05-15 DIAGNOSIS — N20.1 URETERAL STONE: Primary | ICD-10-CM

## 2025-05-15 RX ORDER — SULFAMETHOXAZOLE AND TRIMETHOPRIM 800; 160 MG/1; MG/1
1 TABLET ORAL ONCE
Status: COMPLETED | OUTPATIENT
Start: 2025-05-15 | End: 2025-05-15

## 2025-05-15 RX ADMIN — SULFAMETHOXAZOLE AND TRIMETHOPRIM 1 TABLET: 800; 160 TABLET ORAL at 10:59

## 2025-05-15 NOTE — NURSING NOTE
Pt in for nurse only visit for ureteral stent removal. Stent string was visualized outside of the urethra. Removed stent without incident, stent was fully intact, and patient tolerated removal well. Educated pt on post stent removal symptoms and scheduled for follow up.     Thank you,     Kayla MOSQUERA BSN, PHN  RN Care Coordinator Urology  [facility]

## 2025-05-30 ENCOUNTER — HOSPITAL ENCOUNTER (OUTPATIENT)
Dept: ULTRASOUND IMAGING | Facility: HOSPITAL | Age: 50
Discharge: HOME | End: 2025-05-30
Attending: UROLOGY | Admitting: UROLOGY
Payer: COMMERCIAL

## 2025-05-30 DIAGNOSIS — N20.1 URETERAL STONE: ICD-10-CM

## 2025-05-30 DIAGNOSIS — N13.2 HYDRONEPHROSIS WITH URINARY OBSTRUCTION DUE TO URETERAL CALCULUS: ICD-10-CM

## 2025-05-30 PROCEDURE — 76770 US EXAM ABDO BACK WALL COMP: CPT

## 2025-06-14 LAB
AMMONIA 24H UR-SRATE: 30 MMOL/24 HR
CA H2 PHOS DIHYD 24H SATFR UR: 1.2
CALCIUM 24H UR-MRATE: 244 MG/24 HR
CALCIUM/CREAT 24H UR: 131 MG/G CREAT
CAOX INDEX 24H UR-RTO: 5.5
CHLORIDE 24H UR-SRATE: 235 MMOL/24 HR
CITRATE 24H UR-MRATE: 615 MG/24 HR
CREAT 24H UR-MRATE: 1860 MG/24 HR
CREAT/BW 24H UR-RELMRAT: 21 MG/24 HR/KG
CYSTINE 24H UR QL: ABNORMAL
Lab: 1 G/KG/24 HR
Lab: 2.8 MG/24 HR/KG
Lab: ABNORMAL
MAGNESIUM 24H UR-MRATE: 76 MG/24 HR
OXALATE 24H UR-MRATE: 31 MG/24 HR
PH 24H UR: 6.25 [PH]
PHOSPHATE 24H UR-MRATE: 824 MG/24 HR
POTASSIUM 24H UR-SRATE: 75 MMOL/24 HR
SODIUM 24H UR-SRATE: 231 MMOL/24 HR
SPECIMEN VOL 24H UR: 2040 ML/24 HR
SULFATE 24H UR-SRATE: 36 MEQ/24 HR
URATE 24H SATFR UR: 0.5
URATE 24H UR-MRATE: 833 MG/24 HR
UUN 24H UR-MRATE: 10.95 G/24 HR

## 2025-06-16 ENCOUNTER — RESULTS FOLLOW-UP (OUTPATIENT)
Dept: UROLOGY | Facility: OTHER | Age: 50
End: 2025-06-16

## 2025-06-26 ENCOUNTER — OFFICE VISIT (OUTPATIENT)
Dept: UROLOGY | Facility: OTHER | Age: 50
End: 2025-06-26
Attending: UROLOGY
Payer: COMMERCIAL

## 2025-06-26 ENCOUNTER — HOSPITAL ENCOUNTER (EMERGENCY)
Facility: HOSPITAL | Age: 50
Discharge: HOME | End: 2025-06-26
Attending: EMERGENCY MEDICINE | Admitting: EMERGENCY MEDICINE
Payer: COMMERCIAL

## 2025-06-26 VITALS
OXYGEN SATURATION: 93 % | RESPIRATION RATE: 18 BRPM | HEART RATE: 67 BPM | SYSTOLIC BLOOD PRESSURE: 105 MMHG | TEMPERATURE: 96.6 F | DIASTOLIC BLOOD PRESSURE: 59 MMHG

## 2025-06-26 VITALS — SYSTOLIC BLOOD PRESSURE: 153 MMHG | OXYGEN SATURATION: 97 % | HEART RATE: 84 BPM | DIASTOLIC BLOOD PRESSURE: 93 MMHG

## 2025-06-26 DIAGNOSIS — N20.0 KIDNEY STONE: Primary | ICD-10-CM

## 2025-06-26 DIAGNOSIS — G43.909 MIGRAINE WITHOUT STATUS MIGRAINOSUS, NOT INTRACTABLE, UNSPECIFIED MIGRAINE TYPE: ICD-10-CM

## 2025-06-26 PROCEDURE — 99284 EMERGENCY DEPT VISIT MOD MDM: CPT | Performed by: EMERGENCY MEDICINE

## 2025-06-26 PROCEDURE — 96375 TX/PRO/DX INJ NEW DRUG ADDON: CPT | Performed by: EMERGENCY MEDICINE

## 2025-06-26 PROCEDURE — 258N000003 HC RX IP 258 OP 636: Performed by: EMERGENCY MEDICINE

## 2025-06-26 PROCEDURE — 250N000011 HC RX IP 250 OP 636: Performed by: EMERGENCY MEDICINE

## 2025-06-26 PROCEDURE — 96374 THER/PROPH/DIAG INJ IV PUSH: CPT | Performed by: EMERGENCY MEDICINE

## 2025-06-26 PROCEDURE — 96361 HYDRATE IV INFUSION ADD-ON: CPT | Performed by: EMERGENCY MEDICINE

## 2025-06-26 PROCEDURE — 99284 EMERGENCY DEPT VISIT MOD MDM: CPT | Mod: 25 | Performed by: EMERGENCY MEDICINE

## 2025-06-26 RX ORDER — METOCLOPRAMIDE HYDROCHLORIDE 5 MG/ML
10 INJECTION INTRAMUSCULAR; INTRAVENOUS ONCE
Status: COMPLETED | OUTPATIENT
Start: 2025-06-26 | End: 2025-06-26

## 2025-06-26 RX ORDER — KETOROLAC TROMETHAMINE 15 MG/ML
15 INJECTION, SOLUTION INTRAMUSCULAR; INTRAVENOUS ONCE
Status: COMPLETED | OUTPATIENT
Start: 2025-06-26 | End: 2025-06-26

## 2025-06-26 RX ADMIN — METOCLOPRAMIDE 10 MG: 5 INJECTION, SOLUTION INTRAMUSCULAR; INTRAVENOUS at 05:43

## 2025-06-26 RX ADMIN — KETOROLAC TROMETHAMINE 15 MG: 15 INJECTION, SOLUTION INTRAMUSCULAR; INTRAVENOUS at 05:43

## 2025-06-26 RX ADMIN — SODIUM CHLORIDE 1000 ML: 9 INJECTION, SOLUTION INTRAVENOUS at 05:43

## 2025-06-26 ASSESSMENT — ENCOUNTER SYMPTOMS
SHORTNESS OF BREATH: 0
FEVER: 0
CHILLS: 0

## 2025-06-26 ASSESSMENT — PAIN SCALES - GENERAL: PAINLEVEL_OUTOF10: NO PAIN (0)

## 2025-06-26 ASSESSMENT — ACTIVITIES OF DAILY LIVING (ADL): ADLS_ACUITY_SCORE: 41
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
## 2025-06-26 NOTE — ED TRIAGE NOTES
"Pt is here with c/o \"migraine since 1 am today\"  Flexeril at 1 am with no releif, ibu at 4 am but unsure if helped d/t vomttimg        "

## 2025-06-26 NOTE — PROGRESS NOTES
[facility] Cystine, Urine, Qualitative  Negative Neg     [facility] Urine Volume (Preserved)  500 - 4,000 mL/24 hr 2,040    [facility] Calcium Oxalate Saturation  6.00 - 10.00 5.50 Low     [facility] Calcium, Urine  <250 mg/24 hr 244    [facility] Oxalate, Urine  20 - 40 mg/24 hr 31    [facility] Citrate, Urine  >450 mg/24 hr 615    [facility] Calcium Phosphate Saturation  0.50 - 2.00 1.20    [facility] pH, 24 hr, Urine  5.800 - 6.200 6.250 High     [facility] Uric Acid Saturation  <1.00 0.50    [facility] Uric Acid, Urine  <800 mg/24 hr 833 High     [facility] Sodium, Urine  50 - 150 mmol/24 hr 231 High     [facility] Potassium, Urine  20 - 100 mmol/24 hr 75    [facility] Magnesium, Urine  30 - 120 mg/24 hr 76    [facility] Phosphorus, Urine  600 - 1,200 mg/24 hr 824    [facility] Ammonium, Urine  15 - 60 mmol/24 hr 30    [facility] Chloride, Urine  70 - 250 mmol/24 hr 235    [facility] Sulfate, Urine  20 - 80 meq/24 hr 36    [facility] Urea Nitrogen, Urine  6.00 - 14.00 g/24 hr 10.95    [facility] Protein Catabolic Rate  0.8 - 1.4 g/kg/24 hr 1.0    [facility] Creatinine, Urine  Not Applic. mg/24 hr 1,860    [facility] Creatinine/Kg Body Weight  11.9 - 24.4 mg/24 hr/kg 21.0    [facility] Calcium/Kg Body Weight  <4.0 mg/24 hr/kg 2.8    [facility] Calcium/Creatinine Ratio  34 - 196 mg/g creat 131   PROCEDURE: US RENAL COMPLETE NON-VASCULAR     HISTORY: . Left ureteral calculus     TECHNIQUE: Targeted sonographic assessment of the kidneys and bladder  was performed.     COMPARISON:  None.     MEASUREMENTS:     Right renal length: 10.9 cm  Left renal length: 11.7 cm     RENAL FINDINGS: No renal masses or hydronephrosis is seen. No  echogenic foci to suggest renal calculi are noted.     BLADDER: Bladder appears normal.                                                                      IMPRESSION:  Normal kidneys    Comment: Calculi composed primarily of:  90% calcium oxalate monohydrate, and  10% calcium oxalate dihydrate.     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 He is doing well with no residual flank pain or urinary symptoms.    He notes that he consumes steak and beef jerky regularly. 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 His 24 hour urine was collected at home.    Exam: looks well, no distress.    Impression: high risk stone former s/p ureteroscopy  Plan: he has a stone fragment in his left kidney that could not be accessed during the procedure. Will put him on tight surveillance. Discussed limiting animal meat in the diet, especially given high urine urine acid which can act as a nidus for stone formation. Also dietary sodium is high. He will consume more fluid at work. Follow up in 6 months with renal ultrasound and [facility] prior.

## 2025-08-26 DIAGNOSIS — G43.009 MIGRAINE WITHOUT AURA AND WITHOUT STATUS MIGRAINOSUS, NOT INTRACTABLE: ICD-10-CM

## 2025-08-26 DIAGNOSIS — M50.30 DDD (DEGENERATIVE DISC DISEASE), CERVICAL: ICD-10-CM

## 2025-08-26 RX ORDER — CYCLOBENZAPRINE HCL 5 MG
5 TABLET ORAL 2 TIMES DAILY PRN
Qty: 60 TABLET | Refills: 0 | Status: SHIPPED | OUTPATIENT
Start: 2025-08-26

## (undated) DEVICE — COVER LT HANDLE 2/PK 5160-2FG

## (undated) DEVICE — WIRE GLIDE 0.038"X150CM 3CM STR UWR1038

## (undated) DEVICE — SLEEVE SCD EXPRESS KNEE LENGTH MED 9529

## (undated) DEVICE — SOL WATER IRRIG 1000ML BOTTLE 2F7114

## (undated) DEVICE — TRAY SKIN PREP POVIDONE/IODINE DYND70372

## (undated) DEVICE — PACK GYN CYSTO CUSTOM SMA32GCMBF

## (undated) DEVICE — JELLY LUBRICATING SURGILUBE 2OZ TUBE

## (undated) DEVICE — CATHETER SOF-FLEX OPEN END URETERAL 70CM/5FR 020038-C5

## (undated) DEVICE — BAG PRESSURE INFUSION 1000ML COLORED GA 8808

## (undated) DEVICE — CONTAINER SPECIMEN 4OZ STERILE H1015I

## (undated) DEVICE — EXTRACTOR NGAGE NITINOL 1.7FRX 15CM MODIFIED BASKET G48295

## (undated) DEVICE — SOL NACL 0.9% IRRIG 1000ML BOTTLE 2F7124

## (undated) DEVICE — SOLUTION IV IRRIGATION 0.9% NACL 3L R8206

## (undated) DEVICE — BAG UROLOGICAL TABLE URO CATCHER P056397909

## (undated) DEVICE — PACK BASIN SET UP SUTCNBSBBA

## (undated) DEVICE — LABEL STERILE PREPRINTED FOR OR FRRH01-2M

## (undated) DEVICE — TUBING IRR TUR Y TYPE 2C4041

## (undated) DEVICE — Device

## (undated) DEVICE — DRAPE C-ARM 60X42" 1013

## (undated) DEVICE — FIBER LASER 200 UM DISPOSABLE TFL-FBX200S

## (undated) RX ORDER — FENTANYL CITRATE 50 UG/ML
INJECTION, SOLUTION INTRAMUSCULAR; INTRAVENOUS
Status: DISPENSED
Start: 2025-04-23

## (undated) RX ORDER — FENTANYL CITRATE 50 UG/ML
INJECTION, SOLUTION INTRAMUSCULAR; INTRAVENOUS
Status: DISPENSED
Start: 2025-05-07

## (undated) RX ORDER — PROPOFOL 10 MG/ML
INJECTION, EMULSION INTRAVENOUS
Status: DISPENSED
Start: 2025-05-07

## (undated) RX ORDER — ONDANSETRON 2 MG/ML
INJECTION INTRAMUSCULAR; INTRAVENOUS
Status: DISPENSED
Start: 2025-04-23

## (undated) RX ORDER — DEXAMETHASONE SODIUM PHOSPHATE 10 MG/ML
INJECTION, SOLUTION INTRAMUSCULAR; INTRAVENOUS
Status: DISPENSED
Start: 2025-04-23

## (undated) RX ORDER — PROPOFOL 10 MG/ML
INJECTION, EMULSION INTRAVENOUS
Status: DISPENSED
Start: 2025-04-23

## (undated) RX ORDER — FENTANYL CITRATE-0.9 % NACL/PF 10 MCG/ML
PLASTIC BAG, INJECTION (ML) INTRAVENOUS
Status: DISPENSED
Start: 2025-05-07